# Patient Record
Sex: FEMALE | Race: WHITE | NOT HISPANIC OR LATINO | Employment: FULL TIME | URBAN - METROPOLITAN AREA
[De-identification: names, ages, dates, MRNs, and addresses within clinical notes are randomized per-mention and may not be internally consistent; named-entity substitution may affect disease eponyms.]

---

## 2020-06-01 ENCOUNTER — OFFICE VISIT (OUTPATIENT)
Dept: ENDOCRINOLOGY | Facility: CLINIC | Age: 30
End: 2020-06-01

## 2020-06-01 ENCOUNTER — APPOINTMENT (OUTPATIENT)
Dept: LAB | Facility: HOSPITAL | Age: 30
End: 2020-06-01

## 2020-06-01 VITALS
WEIGHT: 134.4 LBS | HEIGHT: 68 IN | DIASTOLIC BLOOD PRESSURE: 72 MMHG | OXYGEN SATURATION: 99 % | SYSTOLIC BLOOD PRESSURE: 128 MMHG | HEART RATE: 72 BPM | BODY MASS INDEX: 20.37 KG/M2

## 2020-06-01 DIAGNOSIS — E03.8 HYPOTHYROIDISM DUE TO HASHIMOTO'S THYROIDITIS: Primary | ICD-10-CM

## 2020-06-01 DIAGNOSIS — E06.3 HYPOTHYROIDISM DUE TO HASHIMOTO'S THYROIDITIS: Primary | ICD-10-CM

## 2020-06-01 PROCEDURE — 84480 ASSAY TRIIODOTHYRONINE (T3): CPT | Performed by: PHYSICIAN ASSISTANT

## 2020-06-01 PROCEDURE — 99203 OFFICE O/P NEW LOW 30 MIN: CPT | Performed by: PHYSICIAN ASSISTANT

## 2020-06-01 PROCEDURE — 84443 ASSAY THYROID STIM HORMONE: CPT | Performed by: PHYSICIAN ASSISTANT

## 2020-06-01 PROCEDURE — 84439 ASSAY OF FREE THYROXINE: CPT | Performed by: PHYSICIAN ASSISTANT

## 2020-06-01 RX ORDER — LEVOTHYROXINE SODIUM 0.05 MG/1
1.5 TABLET ORAL DAILY
COMMUNITY
Start: 2020-05-02 | End: 2020-06-02 | Stop reason: SDUPTHER

## 2020-06-01 NOTE — PROGRESS NOTES
"  Chief Complaint:   Damari Muller is a 30 y.o. female who is being seen today for  Hypothyroidism . Referred by Amandeep Johnson,      HPI     30 year fairly healthy female comes in today to establish care with new endocrinology provider since moving to [place].   She reports to be feeling fairly well. Has chronic fatigue, but that has improved recently. Has never been a good sleeper, sleeps about 4-6 hours per night. Has had sleep studies in the past. Was told she has \"borderline sleep apnea.\"  She denies recent weight changes, dry skin, arthralgias, edema.  Doesn't eat gluten, dairy, night shades.   She has celiac disease. Also has dye allergies.     Diagnosed: 2010, was diagnosed with Hashimoto's thyroiditis  Medication: levothyroxine 50mcg daily 1 1/2 tabs daily (gluten free from a particular , takes the 50mcg pill due to no dyes, she takes at night at bedtime, about 3-4 hours after dinner. She takes magnesium for migraines, vit d, zinc, selenium. She takes selenium with levothyroxine, otherwise all supplements in the morning. Has had this same routine for a few years and had been stable on current dose.  Previous medication: Synthroid  Hx of radiation to head/neck: no  Family hx of thyroid cancer: no  Grandmother had a thyroid condition but unsure which one.     The following portions of the patient's history were reviewed and updated as appropriate: allergies, current medications, past family history, past medical history, past social history, past surgical history and problem list.    Review of Systems  Review of Systems   Constitutional: Positive for fatigue.   All other systems reviewed and are negative.       Current medications:  Current Outpatient Medications   Medication Sig Dispense Refill   • levothyroxine (SYNTHROID, LEVOTHROID) 50 MCG tablet Take 1.5 tablets by mouth Daily. Pt taking 75 mcg daily.       No current facility-administered medications for this visit.        Physical Exam "   Vitals:    06/01/20 1436   BP: 128/72   Pulse: 72   SpO2: 99%   Body mass index is 20.44 kg/m².  Physical Exam   Constitutional: She is oriented to person, place, and time. She appears well-developed. No distress.   HENT:   Head: Normocephalic.   Right Ear: External ear normal.   Left Ear: External ear normal.   Mouth/Throat: No oropharyngeal exudate.   Eyes: Conjunctivae and lids are normal. Right eye exhibits no discharge. Left eye exhibits no discharge. Right pupil is reactive. Left pupil is reactive.   Neck: No JVD present. No tracheal deviation present. No thyroid mass and no thyromegaly present.   Cardiovascular: Normal rate, regular rhythm, normal heart sounds and intact distal pulses.   No murmur heard.  Pulmonary/Chest: Effort normal and breath sounds normal. No respiratory distress. She has no wheezes.   Abdominal: Soft. Bowel sounds are normal. There is no tenderness.   Musculoskeletal: She exhibits no edema or tenderness.   Lymphadenopathy:     She has no cervical adenopathy.   Neurological: She is alert and oriented to person, place, and time.   Skin: Skin is warm, dry and intact. No rash noted. She is not diaphoretic. No erythema.   Psychiatric: She has a normal mood and affect. Her speech is normal and behavior is normal. Thought content normal.       Labs and Imaging   No results found for: TSH, O6ZIZLI, W7MUGON, THYROIDAB    Assessment / Plan     Damari was seen today for establish care and hypothyroidism.    Diagnoses and all orders for this visit:    Hypothyroidism due to Hashimoto's thyroiditis  -     TSH  -     T4, Free  -     T3        Problem List Items Addressed This Visit     None      Visit Diagnoses     Hypothyroidism due to Hashimoto's thyroiditis    -  Primary    Relevant Medications    levothyroxine (SYNTHROID, LEVOTHROID) 50 MCG tablet    Other Relevant Orders    TSH    T4, Free    T3        Diagnosis was discussed and reviewed with the patient including the advantages of drug  therapy.        1. Patient appears clinically euthyroid. Continue levothyroxine 50mcg 1 1/2 tab daily (no dye in 50mcg pill). TFTs today.       We have discussed in details the nature of the thyroid disease, thyroid hormone action, optimal TSH goals of 0.5-3.5, method of administration of levothyroxine and medication interactions.  I recommended taking the medication on an empty stomach in the morning or at bedtime, at least 30 minutes prior to intake of food or hot drinks and 4 hours apart from calcium or iron supplements.  2. Patient will return to our office in 6 months.  3. The risks and benefits of my recommendations, as well as other treatment options were discussed with the patient today. Questions were answered.      Efrain Siu PA-C

## 2020-06-02 DIAGNOSIS — E06.3 HYPOTHYROIDISM DUE TO HASHIMOTO'S THYROIDITIS: Primary | ICD-10-CM

## 2020-06-02 DIAGNOSIS — E03.8 HYPOTHYROIDISM DUE TO HASHIMOTO'S THYROIDITIS: Primary | ICD-10-CM

## 2020-06-02 LAB
T3 SERPL-MCNC: 79.9 NG/DL (ref 80–200)
T4 FREE SERPL-MCNC: 1.28 NG/DL (ref 0.93–1.7)
TSH SERPL DL<=0.05 MIU/L-ACNC: 2.69 UIU/ML (ref 0.27–4.2)

## 2020-06-02 RX ORDER — LEVOTHYROXINE SODIUM 0.05 MG/1
75 TABLET ORAL DAILY
Qty: 135 TABLET | Refills: 1 | Status: SHIPPED | OUTPATIENT
Start: 2020-06-02 | End: 2020-12-03 | Stop reason: ALTCHOICE

## 2020-07-09 ENCOUNTER — TRANSCRIBE ORDERS (OUTPATIENT)
Dept: ADMINISTRATIVE | Facility: HOSPITAL | Age: 30
End: 2020-07-09

## 2020-07-09 DIAGNOSIS — Z80.3 FAMILY HISTORY OF BREAST CANCER IN FIRST DEGREE RELATIVE: Primary | ICD-10-CM

## 2020-10-20 ENCOUNTER — HOSPITAL ENCOUNTER (OUTPATIENT)
Dept: MAMMOGRAPHY | Facility: HOSPITAL | Age: 30
Discharge: HOME | End: 2020-10-20
Admitting: OBSTETRICS & GYNECOLOGY

## 2020-10-20 DIAGNOSIS — Z80.3 FAMILY HISTORY OF BREAST CANCER IN FIRST DEGREE RELATIVE: ICD-10-CM

## 2020-10-20 PROCEDURE — 77067 SCR MAMMO BI INCL CAD: CPT | Performed by: RADIOLOGY

## 2020-10-20 PROCEDURE — 77063 BREAST TOMOSYNTHESIS BI: CPT | Performed by: RADIOLOGY

## 2020-10-20 PROCEDURE — 77067 SCR MAMMO BI INCL CAD: CPT

## 2020-10-20 PROCEDURE — 77063 BREAST TOMOSYNTHESIS BI: CPT

## 2020-12-01 ENCOUNTER — OFFICE VISIT (OUTPATIENT)
Dept: ENDOCRINOLOGY | Facility: CLINIC | Age: 30
End: 2020-12-01

## 2020-12-01 ENCOUNTER — LAB (OUTPATIENT)
Dept: LAB | Facility: HOSPITAL | Age: 30
End: 2020-12-01

## 2020-12-01 VITALS
WEIGHT: 140.6 LBS | DIASTOLIC BLOOD PRESSURE: 62 MMHG | OXYGEN SATURATION: 99 % | HEART RATE: 61 BPM | BODY MASS INDEX: 21.38 KG/M2 | SYSTOLIC BLOOD PRESSURE: 100 MMHG

## 2020-12-01 DIAGNOSIS — L65.9 HAIR LOSS: ICD-10-CM

## 2020-12-01 DIAGNOSIS — E06.3 HYPOTHYROIDISM DUE TO HASHIMOTO'S THYROIDITIS: Primary | ICD-10-CM

## 2020-12-01 DIAGNOSIS — E03.8 HYPOTHYROIDISM DUE TO HASHIMOTO'S THYROIDITIS: Primary | ICD-10-CM

## 2020-12-01 DIAGNOSIS — R53.82 CHRONIC FATIGUE: ICD-10-CM

## 2020-12-01 LAB
BASOPHILS # BLD AUTO: 0.05 10*3/MM3 (ref 0–0.2)
BASOPHILS NFR BLD AUTO: 1 % (ref 0–1.5)
DEPRECATED RDW RBC AUTO: 41.8 FL (ref 37–54)
EOSINOPHIL # BLD AUTO: 0.1 10*3/MM3 (ref 0–0.4)
EOSINOPHIL NFR BLD AUTO: 2 % (ref 0.3–6.2)
ERYTHROCYTE [DISTWIDTH] IN BLOOD BY AUTOMATED COUNT: 12 % (ref 12.3–15.4)
HCT VFR BLD AUTO: 40.8 % (ref 34–46.6)
HGB BLD-MCNC: 13.3 G/DL (ref 12–15.9)
IMM GRANULOCYTES # BLD AUTO: 0.01 10*3/MM3 (ref 0–0.05)
IMM GRANULOCYTES NFR BLD AUTO: 0.2 % (ref 0–0.5)
IRON 24H UR-MRATE: 118 MCG/DL (ref 37–145)
LYMPHOCYTES # BLD AUTO: 1.95 10*3/MM3 (ref 0.7–3.1)
LYMPHOCYTES NFR BLD AUTO: 39.8 % (ref 19.6–45.3)
MCH RBC QN AUTO: 31.1 PG (ref 26.6–33)
MCHC RBC AUTO-ENTMCNC: 32.6 G/DL (ref 31.5–35.7)
MCV RBC AUTO: 95.3 FL (ref 79–97)
MONOCYTES # BLD AUTO: 0.43 10*3/MM3 (ref 0.1–0.9)
MONOCYTES NFR BLD AUTO: 8.8 % (ref 5–12)
NEUTROPHILS NFR BLD AUTO: 2.36 10*3/MM3 (ref 1.7–7)
NEUTROPHILS NFR BLD AUTO: 48.2 % (ref 42.7–76)
NRBC BLD AUTO-RTO: 0 /100 WBC (ref 0–0.2)
PLATELET # BLD AUTO: 182 10*3/MM3 (ref 140–450)
PMV BLD AUTO: 12.1 FL (ref 6–12)
RBC # BLD AUTO: 4.28 10*6/MM3 (ref 3.77–5.28)
WBC # BLD AUTO: 4.9 10*3/MM3 (ref 3.4–10.8)

## 2020-12-01 PROCEDURE — 83540 ASSAY OF IRON: CPT | Performed by: PHYSICIAN ASSISTANT

## 2020-12-01 PROCEDURE — 85025 COMPLETE CBC W/AUTO DIFF WBC: CPT | Performed by: PHYSICIAN ASSISTANT

## 2020-12-01 PROCEDURE — 82607 VITAMIN B-12: CPT | Performed by: PHYSICIAN ASSISTANT

## 2020-12-01 PROCEDURE — 99214 OFFICE O/P EST MOD 30 MIN: CPT | Performed by: PHYSICIAN ASSISTANT

## 2020-12-01 PROCEDURE — 84443 ASSAY THYROID STIM HORMONE: CPT | Performed by: PHYSICIAN ASSISTANT

## 2020-12-01 PROCEDURE — 82306 VITAMIN D 25 HYDROXY: CPT | Performed by: PHYSICIAN ASSISTANT

## 2020-12-01 PROCEDURE — 84439 ASSAY OF FREE THYROXINE: CPT | Performed by: PHYSICIAN ASSISTANT

## 2020-12-01 PROCEDURE — 84480 ASSAY TRIIODOTHYRONINE (T3): CPT | Performed by: PHYSICIAN ASSISTANT

## 2020-12-01 PROCEDURE — 82728 ASSAY OF FERRITIN: CPT | Performed by: PHYSICIAN ASSISTANT

## 2020-12-01 RX ORDER — LEVOTHYROXINE SODIUM 0.05 MG/1
75 TABLET ORAL DAILY
Qty: 135 TABLET | Refills: 1 | Status: CANCELLED | OUTPATIENT
Start: 2020-12-01

## 2020-12-01 NOTE — PROGRESS NOTES
Chief Complaint:   Damari Muller is a 30 y.o. female who is being seen today for  Hypothyroidism .     HPI     30 year fairly healthy female comes in today for f/u of hypothyroidism.   She has celiac disease as well as dye allergies. She takes levothyroxine 50 mg 1.5 pills daily due to no dyes.   Tirosint was not approved in the past though not sure if it was PA'd.   She has hx of iron deficiency and would like iron checked. She has been more fatigued recently with some dizziness. Hair loss is chronic. Does not feel like her symptoms are related to the thyroid- in the past when she has issues with thyroid levels being off she experienced facial swelling, which she is not having right now. She sleeps poorly, but this is chronic and unchanged for her.   She takes vit d 5000 IU daily.     Diagnosed: 2010, was diagnosed with Hashimoto's thyroiditis  Medication: levothyroxine 50mcg daily 1 1/2 tabs daily (gluten free from a particular , takes the 50mcg pill due to no dyes, she takes at night at bedtime, about 3-4 hours after dinner. She takes magnesium for migraines, vit d, zinc, selenium. She takes selenium with levothyroxine, otherwise all supplements in the morning. Has had this same routine for a few years and had been stable on current dose.  Previous medication: Synthroid  Hx of radiation to head/neck: no  Family hx of thyroid cancer: no  Grandmother had a thyroid condition but unsure which one.     The following portions of the patient's history were reviewed and updated as appropriate: allergies, current medications, past family history, past medical history, past social history, past surgical history and problem list.    Review of Systems  Review of Systems   Constitutional: Positive for fatigue.   Skin:        Hair loss   Neurological: Positive for dizziness.   All other systems reviewed and are negative.       Current medications:  Current Outpatient Medications   Medication Sig Dispense  Refill   • levothyroxine (SYNTHROID, LEVOTHROID) 50 MCG tablet Take 1.5 tablets by mouth Daily. Pt taking 75 mcg daily. 135 tablet 1     No current facility-administered medications for this visit.        Physical Exam   Vitals:    12/01/20 1305   BP: 100/62   Pulse: 61   SpO2: 99%   Body mass index is 21.38 kg/m².  Physical Exam   Constitutional: She is oriented to person, place, and time. She appears well-developed. No distress.   HENT:   Head: Normocephalic.   Right Ear: External ear normal.   Left Ear: External ear normal.   Mouth/Throat: No oropharyngeal exudate.   Eyes: Conjunctivae and lids are normal. Right eye exhibits no discharge. Left eye exhibits no discharge. Right pupil is reactive. Left pupil is reactive.   Neck: No JVD present. No tracheal deviation present. No thyroid mass and no thyromegaly present.   Cardiovascular: Normal rate, regular rhythm and normal heart sounds.   No murmur heard.  Pulmonary/Chest: Effort normal and breath sounds normal. No respiratory distress. She has no wheezes.   Abdominal: Soft. Bowel sounds are normal. There is no abdominal tenderness.   Musculoskeletal: No tenderness.   Lymphadenopathy:     She has no cervical adenopathy.   Neurological: She is alert and oriented to person, place, and time.   Skin: Skin is warm and dry. No rash noted. She is not diaphoretic. No erythema.   Psychiatric: Her speech is normal and behavior is normal. Thought content normal.       Labs and Imaging   Lab Results   Component Value Date    TSH 2.690 06/01/2020    O9SMSVN 79.9 (L) 06/01/2020       Assessment / Plan     Diagnoses and all orders for this visit:    1. Hypothyroidism due to Hashimoto's thyroiditis (Primary)  -     TSH  -     T4, Free    2. Hair loss  -     CBC & Differential  -     Iron  -     Ferritin  -     Vitamin D 25 Hydroxy  -     CBC Auto Differential    3. Chronic fatigue  -     CBC & Differential  -     Iron  -     Ferritin  -     Vitamin D 25 Hydroxy  -     Vitamin  B12  -     CBC Auto Differential    Other orders  -     Cancel: levothyroxine (SYNTHROID, LEVOTHROID) 50 MCG tablet; Take 1.5 tablets by mouth Daily. Pt taking 75 mcg daily.  Dispense: 135 tablet; Refill: 1        Problem List Items Addressed This Visit     None      Visit Diagnoses     Hypothyroidism due to Hashimoto's thyroiditis    -  Primary    Relevant Orders    TSH    T4, Free    Hair loss        Relevant Orders    CBC & Differential    Iron    Ferritin    Vitamin D 25 Hydroxy    CBC Auto Differential    Chronic fatigue        Relevant Orders    CBC & Differential    Iron    Ferritin    Vitamin D 25 Hydroxy    Vitamin B12    CBC Auto Differential        Diagnosis was discussed and reviewed with the patient including the advantages of drug therapy.        1. Patient appears clinically euthyroid. Continue levothyroxine 50mcg 1 1/2 tab daily (no dye in 50mcg pill). TFTs today. Will try to change to Tirosint 75 mcg daily- will require a PA. Check vit d, iron, ferritin, cbc, b12 with increased fatigue recently.   2. Patient will return to our office in 12 months.  3. The risks and benefits of my recommendations, as well as other treatment options were discussed with the patient today. Questions were answered.      Efrain Siu PA-C

## 2020-12-02 DIAGNOSIS — E06.3 HYPOTHYROIDISM DUE TO HASHIMOTO'S THYROIDITIS: Primary | ICD-10-CM

## 2020-12-02 DIAGNOSIS — E03.8 HYPOTHYROIDISM DUE TO HASHIMOTO'S THYROIDITIS: Primary | ICD-10-CM

## 2020-12-02 LAB
25(OH)D3 SERPL-MCNC: 57.9 NG/ML (ref 30–100)
FERRITIN SERPL-MCNC: 33.6 NG/ML (ref 13–150)
T3 SERPL-MCNC: 70.4 NG/DL (ref 80–200)
T4 FREE SERPL-MCNC: 1.2 NG/DL (ref 0.93–1.7)
TSH SERPL DL<=0.05 MIU/L-ACNC: 3.81 UIU/ML (ref 0.27–4.2)
VIT B12 BLD-MCNC: 1082 PG/ML (ref 211–946)

## 2020-12-03 DIAGNOSIS — E06.3 HYPOTHYROIDISM DUE TO HASHIMOTO'S THYROIDITIS: Primary | ICD-10-CM

## 2020-12-03 DIAGNOSIS — E03.8 HYPOTHYROIDISM DUE TO HASHIMOTO'S THYROIDITIS: Primary | ICD-10-CM

## 2020-12-03 RX ORDER — LEVOTHYROXINE SODIUM 75 UG/1
75 CAPSULE ORAL DAILY
Qty: 90 CAPSULE | Refills: 1 | Status: SHIPPED | OUTPATIENT
Start: 2020-12-03 | End: 2021-06-08

## 2020-12-03 RX ORDER — LIOTHYRONINE SODIUM 5 UG/1
2.5 TABLET ORAL DAILY
Qty: 45 TABLET | Refills: 1 | Status: SHIPPED | OUTPATIENT
Start: 2020-12-03 | End: 2021-04-14

## 2020-12-04 ENCOUNTER — TELEPHONE (OUTPATIENT)
Dept: ENDOCRINOLOGY | Facility: CLINIC | Age: 30
End: 2020-12-04

## 2020-12-04 DIAGNOSIS — R74.8 ELEVATED VITAMIN B12 LEVEL: Primary | ICD-10-CM

## 2020-12-04 NOTE — TELEPHONE ENCOUNTER
Returned patient call, she just wanted to make sure her SX were not related to B12 level. All questions answered and PT expressed understanding

## 2021-04-14 ENCOUNTER — OFFICE VISIT (OUTPATIENT)
Dept: ENDOCRINOLOGY | Facility: CLINIC | Age: 31
End: 2021-04-14

## 2021-04-14 ENCOUNTER — LAB (OUTPATIENT)
Dept: LAB | Facility: HOSPITAL | Age: 31
End: 2021-04-14

## 2021-04-14 VITALS
HEIGHT: 68 IN | DIASTOLIC BLOOD PRESSURE: 60 MMHG | BODY MASS INDEX: 20.03 KG/M2 | TEMPERATURE: 97.5 F | HEART RATE: 65 BPM | SYSTOLIC BLOOD PRESSURE: 100 MMHG | OXYGEN SATURATION: 100 % | WEIGHT: 132.2 LBS

## 2021-04-14 DIAGNOSIS — E03.8 HYPOTHYROIDISM DUE TO HASHIMOTO'S THYROIDITIS: Primary | ICD-10-CM

## 2021-04-14 DIAGNOSIS — E03.8 HYPOTHYROIDISM DUE TO HASHIMOTO'S THYROIDITIS: Primary | Chronic | ICD-10-CM

## 2021-04-14 DIAGNOSIS — R53.82 CHRONIC FATIGUE: ICD-10-CM

## 2021-04-14 DIAGNOSIS — E06.3 HYPOTHYROIDISM DUE TO HASHIMOTO'S THYROIDITIS: Primary | ICD-10-CM

## 2021-04-14 DIAGNOSIS — E06.3 HYPOTHYROIDISM DUE TO HASHIMOTO'S THYROIDITIS: Primary | Chronic | ICD-10-CM

## 2021-04-14 LAB
CORTIS SERPL-MCNC: 15.19 MCG/DL
CORTIS SERPL-MCNC: 25.97 MCG/DL
CORTIS SERPL-MCNC: 29.75 MCG/DL
T3 SERPL-MCNC: 135 NG/DL (ref 80–200)
T4 FREE SERPL-MCNC: 1.42 NG/DL (ref 0.93–1.7)
TSH SERPL DL<=0.05 MIU/L-ACNC: 1.98 UIU/ML (ref 0.27–4.2)
VIT B12 BLD-MCNC: 952 PG/ML (ref 211–946)

## 2021-04-14 PROCEDURE — 84439 ASSAY OF FREE THYROXINE: CPT | Performed by: PHYSICIAN ASSISTANT

## 2021-04-14 PROCEDURE — 82533 TOTAL CORTISOL: CPT | Performed by: PHYSICIAN ASSISTANT

## 2021-04-14 PROCEDURE — 84480 ASSAY TRIIODOTHYRONINE (T3): CPT | Performed by: PHYSICIAN ASSISTANT

## 2021-04-14 PROCEDURE — 99214 OFFICE O/P EST MOD 30 MIN: CPT | Performed by: PHYSICIAN ASSISTANT

## 2021-04-14 PROCEDURE — 82607 VITAMIN B-12: CPT | Performed by: PHYSICIAN ASSISTANT

## 2021-04-14 PROCEDURE — 84443 ASSAY THYROID STIM HORMONE: CPT | Performed by: PHYSICIAN ASSISTANT

## 2021-04-14 PROCEDURE — 96372 THER/PROPH/DIAG INJ SC/IM: CPT | Performed by: PHYSICIAN ASSISTANT

## 2021-04-14 RX ORDER — ETONOGESTREL AND ETHINYL ESTRADIOL 11.7; 2.7 MG/1; MG/1
INSERT, EXTENDED RELEASE VAGINAL
COMMUNITY
Start: 2021-03-24 | End: 2021-11-16

## 2021-04-14 RX ORDER — COSYNTROPIN 0.25 MG/ML
0.25 INJECTION, POWDER, FOR SOLUTION INTRAMUSCULAR; INTRAVENOUS ONCE
Status: COMPLETED | OUTPATIENT
Start: 2021-04-14 | End: 2021-04-14

## 2021-04-14 RX ADMIN — COSYNTROPIN 0.25 MG: 0.25 INJECTION, POWDER, FOR SOLUTION INTRAMUSCULAR; INTRAVENOUS at 10:51

## 2021-04-14 NOTE — PROGRESS NOTES
Chief Complaint:   Damari Muller is a 31 y.o. female who is being seen today for  Hypothyroidism .     HPI     30 year fairly healthy female comes in today for f/u of hypothyroidism. Was diagnosed with hashimoto's in 2010 and started on supplement at that time.   Previous medication: Synthroid, levothyroxine, armour.  She has celiac disease as well as dye allergies - therefore requires Tirosint brand, on 75 mcg daily. Also on cytomel 5 mcg 1/2 pill daily. Cytomel added 12/2020 but it has not helped with fatigue.   She c/o chronic fatigue but much worse recently. Sleep is poor but this is unchanged for her. She feels fatigued even after a good nights rest though. Has occasional dizziness, no syncope. Weight stable, appetite stable. Does not crave salt. No symptoms of hypoglycemia.   Hair loss is chronic and stable.   She takes vit d 5000 IU daily.       The following portions of the patient's history were reviewed and updated by me as appropriate: allergies, current medications, past family history, past social history, past surgical history and problem list.    Review of Systems  Review of Systems   Constitutional: Positive for fatigue.   Skin:        Hair loss   Neurological: Positive for dizziness.   All other systems reviewed and are negative.       Current medications:  Current Outpatient Medications   Medication Sig Dispense Refill   • etonogestrel-ethinyl estradiol (NUVARING) 0.12-0.015 MG/24HR vaginal ring      • Tirosint 75 MCG capsule Take 1 capsule by mouth Daily. 90 capsule 1     Current Facility-Administered Medications   Medication Dose Route Frequency Provider Last Rate Last Admin   • cosyntropin (CORTROSYN) injection 0.25 mg  0.25 mg Intramuscular Once Efrain Siu PA-C           Physical Exam   Vitals:    04/14/21 1011   BP: 100/60   Pulse: 65   Temp: 97.5 °F (36.4 °C)   SpO2: 100%   Body mass index is 20.1 kg/m².  Physical Exam   Constitutional: She is oriented to person, place, and time.  She appears well-developed. No distress.   HENT:   Head: Normocephalic.   Right Ear: External ear normal.   Left Ear: External ear normal.   Mouth/Throat: No oropharyngeal exudate.   Eyes: Conjunctivae and lids are normal. Right eye exhibits no discharge. Left eye exhibits no discharge. Right pupil is reactive. Left pupil is reactive.   Neck: No JVD present. No tracheal deviation present. No thyroid mass and no thyromegaly present.   Cardiovascular: Normal rate, regular rhythm and normal heart sounds.   No murmur heard.  Pulmonary/Chest: Effort normal and breath sounds normal. No respiratory distress. She has no wheezes.   Abdominal: Soft. Bowel sounds are normal. There is no abdominal tenderness.   Musculoskeletal: No tenderness.   Lymphadenopathy:     She has no cervical adenopathy.   Neurological: She is alert and oriented to person, place, and time.   Skin: Skin is warm and dry. No rash noted. She is not diaphoretic. No erythema.   Psychiatric: Her speech is normal and behavior is normal. Thought content normal.       Labs and Imaging   Lab Results   Component Value Date    TSH 3.810 12/01/2020    O9DPGGR 70.4 (L) 12/01/2020       Assessment / Plan     Diagnoses and all orders for this visit:    1. Hypothyroidism due to Hashimoto's thyroiditis (Primary)  -     TSH  -     T4, Free  -     T3    2. Chronic fatigue  -     Cortisol  -     Cortisol  -     Cortisol  -     cosyntropin (CORTROSYN) injection 0.25 mg  -     Vitamin B12        Problem List Items Addressed This Visit        Other    Hypothyroidism due to Hashimoto's thyroiditis - Primary (Chronic)    Relevant Orders    TSH    T4, Free    T3      Other Visit Diagnoses     Chronic fatigue        Relevant Medications    cosyntropin (CORTROSYN) injection 0.25 mg    Other Relevant Orders    Cortisol    Cortisol    Cortisol    Vitamin B12        Diagnosis was discussed and reviewed with the patient including the advantages of drug therapy.        1. She c/o  worsening fatigue- repeat TFTs. Screen for adrenal insufficiency though low suspicion. Continue Tirosint 75 mcg daily. DC cytomel - does not feel better on it.   2. Patient will return to our office in 4 months.  3. The risks and benefits of my recommendations, as well as other treatment options were discussed with the patient today. Questions were answered.      Efrain Siu PA-C

## 2021-06-08 DIAGNOSIS — E06.3 HYPOTHYROIDISM DUE TO HASHIMOTO'S THYROIDITIS: ICD-10-CM

## 2021-06-08 DIAGNOSIS — E03.8 HYPOTHYROIDISM DUE TO HASHIMOTO'S THYROIDITIS: ICD-10-CM

## 2021-06-08 RX ORDER — LIOTHYRONINE SODIUM 5 UG/1
TABLET ORAL
Qty: 45 TABLET | Refills: 0 | OUTPATIENT
Start: 2021-06-08

## 2021-06-08 RX ORDER — LEVOTHYROXINE SODIUM 75 UG/1
CAPSULE ORAL
Qty: 90 CAPSULE | Refills: 1 | Status: SHIPPED | OUTPATIENT
Start: 2021-06-08 | End: 2021-12-06 | Stop reason: SDUPTHER

## 2021-06-16 ENCOUNTER — TELEPHONE (OUTPATIENT)
Dept: ENDOCRINOLOGY | Facility: CLINIC | Age: 31
End: 2021-06-16

## 2021-07-08 ENCOUNTER — TELEPHONE (OUTPATIENT)
Dept: ENDOCRINOLOGY | Facility: CLINIC | Age: 31
End: 2021-07-08

## 2021-07-08 NOTE — TELEPHONE ENCOUNTER
[employer] IS REQUIRING A PRIOR AUTH. KARON WITH [employer] STATES THAT SHE SPOKE WITH PATIENT WHO TOLD HER THAT WHEN SHE WENT TO  MEDICATION YESTERDAY, SHE WAS INFORMED THAT IT WOULD HAVE A HIGH COPAY AND THAT SHE NEEDED TO CONTACT HER INSURANCE PROVIDER. [employer] STATES THAT A PRIOR AUTH CAN BE DONE TO SEE IF COVERAGE CAN BE PROVIDED FOR THIS MEDICATION.

## 2021-08-16 ENCOUNTER — LAB (OUTPATIENT)
Dept: LAB | Facility: HOSPITAL | Age: 31
End: 2021-08-16

## 2021-08-16 ENCOUNTER — OFFICE VISIT (OUTPATIENT)
Dept: ENDOCRINOLOGY | Facility: CLINIC | Age: 31
End: 2021-08-16

## 2021-08-16 VITALS
DIASTOLIC BLOOD PRESSURE: 60 MMHG | HEART RATE: 72 BPM | WEIGHT: 127.2 LBS | OXYGEN SATURATION: 100 % | HEIGHT: 68 IN | BODY MASS INDEX: 19.28 KG/M2 | SYSTOLIC BLOOD PRESSURE: 100 MMHG

## 2021-08-16 DIAGNOSIS — E03.8 HYPOTHYROIDISM DUE TO HASHIMOTO'S THYROIDITIS: Primary | Chronic | ICD-10-CM

## 2021-08-16 DIAGNOSIS — E03.8 HYPOTHYROIDISM DUE TO HASHIMOTO'S THYROIDITIS: ICD-10-CM

## 2021-08-16 DIAGNOSIS — R74.8 ELEVATED VITAMIN B12 LEVEL: ICD-10-CM

## 2021-08-16 DIAGNOSIS — E06.3 HYPOTHYROIDISM DUE TO HASHIMOTO'S THYROIDITIS: ICD-10-CM

## 2021-08-16 DIAGNOSIS — E06.3 HYPOTHYROIDISM DUE TO HASHIMOTO'S THYROIDITIS: Primary | Chronic | ICD-10-CM

## 2021-08-16 PROCEDURE — 82607 VITAMIN B-12: CPT

## 2021-08-16 PROCEDURE — 84439 ASSAY OF FREE THYROXINE: CPT

## 2021-08-16 PROCEDURE — 84443 ASSAY THYROID STIM HORMONE: CPT

## 2021-08-16 PROCEDURE — 84480 ASSAY TRIIODOTHYRONINE (T3): CPT

## 2021-08-16 PROCEDURE — 99213 OFFICE O/P EST LOW 20 MIN: CPT | Performed by: PHYSICIAN ASSISTANT

## 2021-08-16 NOTE — PROGRESS NOTES
Chief Complaint:   Damari Muller is a 31 y.o. female who is being seen today for  Hypothyroidism .     HPI     31 year healthy female comes in today for f/u of hypothyroidism. Was diagnosed with hashimoto's in 2010 and started on supplement at that time.   Previous medication: Synthroid, levothyroxine, armour.  She has celiac disease as well as dye allergies - therefore requires Tirosint brand, on 75 mcg daily. Was previously on cytomel 5 mcg daily due to her chronic fatigue, but it was discontinued as it did not improve any of her symptoms.   She continues to have chronic fatigue. No new hypo- or hyperthyroid symptoms since last visit  Is being seen at an [facility] and was found to have low progesterone and testosterone.   Was started on progesterone 200 mg daily and initially felt better but now feels this has caused more irritability and mood swings. Dose was decreased in half few days ago. She will f/u with them in a month. They suggested testosterone supplementation though she did not want this.   Starting new job soon, stationed in [place], but will be working remotely from [place].     ACTH stim test was normal 4/2021.       The following portions of the patient's history were reviewed and updated by me as appropriate: allergies, current medications, past family history, past social history, past surgical history and problem list.    Review of Systems  Review of Systems   Constitutional: Positive for fatigue.   Skin:        Hair loss - stable   All other systems reviewed and are negative.       Current medications:  Current Outpatient Medications   Medication Sig Dispense Refill   • etonogestrel-ethinyl estradiol (NUVARING) 0.12-0.015 MG/24HR vaginal ring      • Tirosint 75 MCG capsule Take 1 capsule by mouth once daily 90 capsule 1     No current facility-administered medications for this visit.       Physical Exam   Vitals:    08/16/21 0942   BP: 100/60   Pulse: 72   SpO2: 100%   Body mass index  is 19.34 kg/m².  Physical Exam   Constitutional: She is oriented to person, place, and time. She appears well-developed. No distress.   HENT:   Head: Normocephalic.   Right Ear: External ear normal.   Left Ear: External ear normal.   Mouth/Throat: No oropharyngeal exudate.   Eyes: Conjunctivae and lids are normal. Right eye exhibits no discharge. Left eye exhibits no discharge. Right pupil is reactive. Left pupil is reactive.   Neck: No JVD present. No tracheal deviation present. No thyroid mass and no thyromegaly present.   Cardiovascular: Normal rate, regular rhythm and normal heart sounds.   No murmur heard.  Pulmonary/Chest: Effort normal and breath sounds normal. No respiratory distress. She has no wheezes.   Abdominal: Soft. Bowel sounds are normal. There is no abdominal tenderness.   Musculoskeletal: No tenderness.   Lymphadenopathy:     She has no cervical adenopathy.   Neurological: She is alert and oriented to person, place, and time.   Skin: Skin is warm and dry. No rash noted. She is not diaphoretic. No erythema.   Psychiatric: Her speech is normal and behavior is normal. Thought content normal.       Labs and Imaging   Lab Results   Component Value Date    TSH 1.980 04/14/2021    M2SLDJK 135.0 04/14/2021       Assessment / Plan     Diagnoses and all orders for this visit:    1. Hypothyroidism due to Hashimoto's thyroiditis (Primary)  -     Cancel: TSH  -     Cancel: T4, Free        Problem List Items Addressed This Visit        Other    Hypothyroidism due to Hashimoto's thyroiditis - Primary (Chronic)        Diagnosis was discussed and reviewed with the patient including the advantages of drug therapy.        1. Pt appears clinically euthyroid. Fatigue is likely multifactorial. Repeat TFTs. Continue Tirosint 75 mcg daily unless labs dictate otherwise.   2. Patient will return to our office in 6 months.  3. The risks and benefits of my recommendations, as well as other treatment options were discussed  with the patient today. Questions were answered.      Efrain Siu PA-C

## 2021-08-17 LAB
T3 SERPL-MCNC: 136 NG/DL (ref 80–200)
T4 FREE SERPL-MCNC: 1.85 NG/DL (ref 0.93–1.7)
TSH SERPL DL<=0.05 MIU/L-ACNC: 1.96 UIU/ML (ref 0.27–4.2)
VIT B12 BLD-MCNC: 1011 PG/ML (ref 211–946)

## 2021-09-10 PROCEDURE — 87077 CULTURE AEROBIC IDENTIFY: CPT | Performed by: NURSE PRACTITIONER

## 2021-09-10 PROCEDURE — 87086 URINE CULTURE/COLONY COUNT: CPT | Performed by: NURSE PRACTITIONER

## 2021-09-10 PROCEDURE — 87186 SC STD MICRODIL/AGAR DIL: CPT | Performed by: NURSE PRACTITIONER

## 2021-09-13 ENCOUNTER — TELEPHONE (OUTPATIENT)
Dept: URGENT CARE | Facility: CLINIC | Age: 31
End: 2021-09-13

## 2021-09-13 NOTE — TELEPHONE ENCOUNTER
Inform patient that ordered antibiotic was appropriate for her infection.  Patient states her symptoms have improved since beginning antibiotic.  Instructed her to finish antibiotic and follow-up as needed.

## 2021-09-22 ENCOUNTER — LAB (OUTPATIENT)
Dept: LAB | Facility: HOSPITAL | Age: 31
End: 2021-09-22

## 2021-09-22 ENCOUNTER — TRANSCRIBE ORDERS (OUTPATIENT)
Dept: LAB | Facility: HOSPITAL | Age: 31
End: 2021-09-22

## 2021-09-22 DIAGNOSIS — R35.0 URINARY FREQUENCY: Primary | ICD-10-CM

## 2021-09-22 DIAGNOSIS — R35.0 URINARY FREQUENCY: ICD-10-CM

## 2021-09-22 LAB
BACTERIA UR QL AUTO: ABNORMAL /HPF
BILIRUB UR QL STRIP: NEGATIVE
BILIRUB UR QL STRIP: NEGATIVE
CLARITY UR: CLEAR
CLARITY UR: CLEAR
COLOR UR: YELLOW
COLOR UR: YELLOW
GLUCOSE UR STRIP-MCNC: NEGATIVE MG/DL
GLUCOSE UR STRIP-MCNC: NEGATIVE MG/DL
HGB UR QL STRIP.AUTO: NEGATIVE
HGB UR QL STRIP.AUTO: NEGATIVE
HYALINE CASTS UR QL AUTO: ABNORMAL /LPF
KETONES UR QL STRIP: NEGATIVE
KETONES UR QL STRIP: NEGATIVE
LEUKOCYTE ESTERASE UR QL STRIP.AUTO: ABNORMAL
LEUKOCYTE ESTERASE UR QL STRIP.AUTO: ABNORMAL
NITRITE UR QL STRIP: NEGATIVE
NITRITE UR QL STRIP: NEGATIVE
PH UR STRIP.AUTO: 7.5 [PH] (ref 5–8)
PH UR STRIP.AUTO: 7.5 [PH] (ref 5–8)
PROT UR QL STRIP: NEGATIVE
PROT UR QL STRIP: NEGATIVE
RBC # UR: ABNORMAL /HPF
REF LAB TEST METHOD: ABNORMAL
SP GR UR STRIP: 1.01 (ref 1–1.03)
SP GR UR STRIP: 1.01 (ref 1–1.03)
SQUAMOUS #/AREA URNS HPF: ABNORMAL /HPF
UROBILINOGEN UR QL STRIP: ABNORMAL
UROBILINOGEN UR QL STRIP: ABNORMAL
WBC UR QL AUTO: ABNORMAL /HPF

## 2021-09-22 PROCEDURE — 87077 CULTURE AEROBIC IDENTIFY: CPT

## 2021-09-22 PROCEDURE — 81001 URINALYSIS AUTO W/SCOPE: CPT

## 2021-09-22 PROCEDURE — 87186 SC STD MICRODIL/AGAR DIL: CPT

## 2021-09-22 PROCEDURE — 87086 URINE CULTURE/COLONY COUNT: CPT

## 2021-09-24 LAB — BACTERIA SPEC AEROBE CULT: ABNORMAL

## 2021-11-16 ENCOUNTER — TELEMEDICINE (OUTPATIENT)
Dept: FAMILY MEDICINE CLINIC | Facility: TELEHEALTH | Age: 31
End: 2021-11-16

## 2021-11-16 DIAGNOSIS — R42 DIZZINESS: Primary | ICD-10-CM

## 2021-11-16 DIAGNOSIS — R11.0 NAUSEA: ICD-10-CM

## 2021-11-16 PROCEDURE — 99213 OFFICE O/P EST LOW 20 MIN: CPT | Performed by: NURSE PRACTITIONER

## 2021-11-16 RX ORDER — ONDANSETRON 4 MG/1
4 TABLET, ORALLY DISINTEGRATING ORAL EVERY 8 HOURS PRN
Qty: 20 TABLET | Refills: 0 | Status: SHIPPED | OUTPATIENT
Start: 2021-11-16 | End: 2021-12-01

## 2021-11-16 RX ORDER — MECLIZINE HYDROCHLORIDE 25 MG/1
25 TABLET ORAL 3 TIMES DAILY PRN
Qty: 30 TABLET | Refills: 0 | Status: SHIPPED | OUTPATIENT
Start: 2021-11-16 | End: 2022-06-01

## 2021-11-16 NOTE — PROGRESS NOTES
You have chosen to receive care through a telehealth visit.  Do you consent to use a video/audio connection for your medical care today? Yes     CHIEF COMPLAINT  Chief Complaint   Patient presents with   • Dizziness         HPI  Damari Muller is a 31 y.o. female  presents with complaint of dizziness for past 3 days.  Lasting about 1-2 hours in the mornings.  This morning was unable to walk.  Denies congestion, ear pain, cough, fever.  She has had issues with anemia in the past and is wondering if that could be causing her dizziness    Review of Systems   Neurological: Positive for dizziness.   All other systems reviewed and are negative.      Past Medical History:   Diagnosis Date   • Anemia    • Celiac disease    • Hypothyroidism    • Migraine    • Thyroid disease        Family History   Problem Relation Age of Onset   • Migraines Mother    • Breast cancer Mother 30   • Cancer Maternal Grandmother    • Migraines Maternal Grandmother    • Thyroid disease Maternal Grandmother    • Ovarian cancer Maternal Grandmother 80   • Cancer Maternal Grandfather        Social History     Socioeconomic History   • Marital status: Single   Tobacco Use   • Smoking status: Never Smoker   • Smokeless tobacco: Never Used   Substance and Sexual Activity   • Alcohol use: Yes     Comment: occa   • Drug use: Never   • Sexual activity: Yes         There were no vitals taken for this visit.    PHYSICAL EXAM  Physical Exam   Constitutional: She is oriented to person, place, and time. She appears well-developed and well-nourished. She does not have a sickly appearance. She does not appear ill.   HENT:   Head: Normocephalic and atraumatic.   Pulmonary/Chest: Effort normal.  No respiratory distress.  Neurological: She is alert and oriented to person, place, and time.         Diagnoses and all orders for this visit:    1. Dizziness (Primary)  -     meclizine (ANTIVERT) 25 MG tablet; Take 1 tablet by mouth 3 (Three) Times a Day As Needed for  Dizziness.  Dispense: 30 tablet; Refill: 0    2. Nausea  -     ondansetron ODT (ZOFRAN-ODT) 4 MG disintegrating tablet; Place 1 tablet on the tongue Every 8 (Eight) Hours As Needed for Nausea or Vomiting.  Dispense: 20 tablet; Refill: 0    --take all medications as prescribed  --schedule with PCP for follow-up regarding dizziness for possible labs  --if no improvement in 24-48 hours, recommend going to ER for evaluation    FOLLOW-UP  As discussed during visit with PCP/[facility] if no improvement or Urgent Care/Emergency Department if worsening of symptoms    Patient verbalizes understanding of medication dosage, comfort measures, instructions for treatment and follow-up.    Jess Moon, APRN  11/16/2021  13:09 EST    This visit was performed via Telehealth.  This patient has been instructed to follow-up with their primary care provider if their symptoms worsen or the treatment provided does not resolve their illness.

## 2021-11-16 NOTE — PATIENT INSTRUCTIONS
Dizziness  Dizziness is a common problem. It is a feeling of unsteadiness or light-headedness. You may feel like you are about to faint. Dizziness can lead to injury if you stumble or fall. Anyone can become dizzy, but dizziness is more common in older adults. This condition can be caused by a number of things, including medicines, dehydration, or illness.  Follow these instructions at home:  Eating and drinking  · Drink enough fluid to keep your urine clear or pale yellow. This helps to keep you from becoming dehydrated. Try to drink more clear fluids, such as water.  · Do not drink alcohol.  · Limit your caffeine intake if told to do so by your health care provider. Check ingredients and nutrition facts to see if a food or beverage contains caffeine.  · Limit your salt (sodium) intake if told to do so by your health care provider. Check ingredients and nutrition facts to see if a food or beverage contains sodium.  Activity  · Avoid making quick movements.  ? Rise slowly from chairs and steady yourself until you feel okay.  ? In the morning, first sit up on the side of the bed. When you feel okay, stand slowly while you hold onto something until you know that your balance is fine.  · If you need to  one place for a long time, move your legs often. Tighten and relax the muscles in your legs while you are standing.  · Do not drive or use heavy machinery if you feel dizzy.  · Avoid bending down if you feel dizzy. Place items in your home so that they are easy for you to reach without leaning over.  Lifestyle  · Do not use any products that contain nicotine or tobacco, such as cigarettes and e-cigarettes. If you need help quitting, ask your health care provider.  · Try to reduce your stress level by using methods such as yoga or meditation. Talk with your health care provider if you need help to manage your stress.  General instructions  · Watch your dizziness for any changes.  · Take over-the-counter and  prescription medicines only as told by your health care provider. Talk with your health care provider if you think that your dizziness is caused by a medicine that you are taking.  · Tell a friend or a family member that you are feeling dizzy. If he or she notices any changes in your behavior, have this person call your health care provider.  · Keep all follow-up visits as told by your health care provider. This is important.  Contact a health care provider if:  · Your dizziness does not go away.  · Your dizziness or light-headedness gets worse.  · You feel nauseous.  · You have reduced hearing.  · You have new symptoms.  · You are unsteady on your feet or you feel like the room is spinning.  Get help right away if:  · You vomit or have diarrhea and are unable to eat or drink anything.  · You have problems talking, walking, swallowing, or using your arms, hands, or legs.  · You feel generally weak.  · You are not thinking clearly or you have trouble forming sentences. It may take a friend or family member to notice this.  · You have chest pain, abdominal pain, shortness of breath, or sweating.  · Your vision changes.  · You have any bleeding.  · You have a severe headache.  · You have neck pain or a stiff neck.  · You have a fever.  These symptoms may represent a serious problem that is an emergency. Do not wait to see if the symptoms will go away. Get medical help right away. Call your local emergency services (911 in the [place] Do not drive yourself to the hospital.  Summary  · Dizziness is a feeling of unsteadiness or light-headedness. This condition can be caused by a number of things, including medicines, dehydration, or illness.  · Anyone can become dizzy, but dizziness is more common in older adults.  · Drink enough fluid to keep your urine clear or pale yellow. Do not drink alcohol.  · Avoid making quick movements if you feel dizzy. Monitor your dizziness for any changes.  This information is not intended to  replace advice given to you by your health care provider. Make sure you discuss any questions you have with your health care provider.  Document Revised: 12/21/2018 Document Reviewed: 01/20/2018  [employer] Patient Education © 2021 [employer]    Nausea, Adult  Nausea is the feeling that you have an upset stomach or that you are about to vomit. Nausea on its own is not usually a serious concern, but it may be an early sign of a more serious medical problem. As nausea gets worse, it can lead to vomiting. If vomiting develops, or if you are not able to drink enough fluids, you are at risk of becoming dehydrated. Dehydration can make you tired and thirsty, cause you to have a dry mouth, and decrease how often you urinate. Older adults and people with other diseases or a weak disease-fighting system (immune system) are at higher risk for dehydration. The main goals of treating your nausea are:  · To relieve your nausea.  · To limit repeated nausea episodes.  · To prevent vomiting and dehydration.  Follow these instructions at home:  Watch your symptoms for any changes. Tell your health care provider about them. Follow these instructions as told by your health care provider.  Eating and drinking         · Take an oral rehydration solution (ORS). This is a drink that is sold at pharmacies and retail stores.  · Drink clear fluids slowly and in small amounts as you are able. Clear fluids include water, ice chips, low-calorie sports drinks, and fruit juice that has water added (diluted fruit juice).  · Eat bland, easy-to-digest foods in small amounts as you are able. These foods include bananas, applesauce, rice, lean meats, toast, and crackers.  · Avoid drinking fluids that contain a lot of sugar or caffeine, such as energy drinks, sports drinks, and soda.  · Avoid alcohol.  · Avoid spicy or fatty foods.  General instructions  · Take over-the-counter and prescription medicines only as told by your health care  provider.  · Rest at home while you recover.  · Drink enough fluid to keep your urine pale yellow.  · Breathe slowly and deeply when you feel nauseous.  · Avoid smelling things that have strong odors.  · Wash your hands often using soap and water. If soap and water are not available, use hand .  · Make sure that all people in your household wash their hands well and often.  · Keep all follow-up visits as told by your health care provider. This is important.  Contact a health care provider if:  · Your nausea gets worse.  · Your nausea does not go away after two days.  · You vomit.  · You cannot drink fluids without vomiting.  · You have any of the following:  ? New symptoms.  ? A fever.  ? A headache.  ? Muscle cramps.  ? A rash.  ? Pain while urinating.  · You feel light-headed or dizzy.  Get help right away if:  · You have pain in your chest, neck, arm, or jaw.  · You feel extremely weak or you faint.  · You have vomit that is bright red or looks like coffee grounds.  · You have bloody or black stools or stools that look like tar.  · You have a severe headache, a stiff neck, or both.  · You have severe pain, cramping, or bloating in your abdomen.  · You have difficulty breathing or are breathing very quickly.  · Your heart is beating very quickly.  · Your skin feels cold and clammy.  · You feel confused.  · You have signs of dehydration, such as:  ? Dark urine, very little urine, or no urine.  ? Cracked lips.  ? Dry mouth.  ? Sunken eyes.  ? Sleepiness.  ? Weakness.  These symptoms may represent a serious problem that is an emergency. Do not wait to see if the symptoms will go away. Get medical help right away. Call your local emergency services (911 in the [place] Do not drive yourself to the hospital.  Summary  · Nausea is the feeling that you have an upset stomach or that you are about to vomit. Nausea on its own is not usually a serious concern, but it may be an early sign of a more serious medical  problem.  · If vomiting develops, or if you are not able to drink enough fluids, you are at risk of becoming dehydrated.  · Follow recommendations for eating and drinking and take over-the-counter and prescription medicines only as told by your health care provider.  · Contact a health care provider right away if your symptoms worsen or you have new symptoms.  · Keep all follow-up visits as told by your health care provider. This is important.  This information is not intended to replace advice given to you by your health care provider. Make sure you discuss any questions you have with your health care provider.  Document Revised: 11/17/2020 Document Reviewed: 05/28/2019  [employer] Patient Education © 2021 [employer]

## 2021-11-22 ENCOUNTER — OFFICE VISIT (OUTPATIENT)
Dept: FAMILY MEDICINE CLINIC | Facility: CLINIC | Age: 31
End: 2021-11-22

## 2021-11-22 VITALS
OXYGEN SATURATION: 98 % | WEIGHT: 129 LBS | RESPIRATION RATE: 20 BRPM | HEART RATE: 78 BPM | BODY MASS INDEX: 19.55 KG/M2 | DIASTOLIC BLOOD PRESSURE: 64 MMHG | TEMPERATURE: 98 F | HEIGHT: 68 IN | SYSTOLIC BLOOD PRESSURE: 106 MMHG

## 2021-11-22 DIAGNOSIS — R42 DIZZINESS: ICD-10-CM

## 2021-11-22 DIAGNOSIS — Z00.00 ANNUAL PHYSICAL EXAM: Primary | ICD-10-CM

## 2021-11-22 LAB
BASOPHILS # BLD AUTO: 0.04 10*3/MM3 (ref 0–0.2)
BASOPHILS NFR BLD AUTO: 1 % (ref 0–1.5)
DEPRECATED RDW RBC AUTO: 43.4 FL (ref 37–54)
EOSINOPHIL # BLD AUTO: 0.03 10*3/MM3 (ref 0–0.4)
EOSINOPHIL NFR BLD AUTO: 0.7 % (ref 0.3–6.2)
ERYTHROCYTE [DISTWIDTH] IN BLOOD BY AUTOMATED COUNT: 11.9 % (ref 12.3–15.4)
HCT VFR BLD AUTO: 43.1 % (ref 34–46.6)
HGB BLD-MCNC: 13.6 G/DL (ref 12–15.9)
IMM GRANULOCYTES # BLD AUTO: 0.01 10*3/MM3 (ref 0–0.05)
IMM GRANULOCYTES NFR BLD AUTO: 0.2 % (ref 0–0.5)
LYMPHOCYTES # BLD AUTO: 1.78 10*3/MM3 (ref 0.7–3.1)
LYMPHOCYTES NFR BLD AUTO: 42.6 % (ref 19.6–45.3)
MCH RBC QN AUTO: 30.9 PG (ref 26.6–33)
MCHC RBC AUTO-ENTMCNC: 31.6 G/DL (ref 31.5–35.7)
MCV RBC AUTO: 98 FL (ref 79–97)
MONOCYTES # BLD AUTO: 0.36 10*3/MM3 (ref 0.1–0.9)
MONOCYTES NFR BLD AUTO: 8.6 % (ref 5–12)
NEUTROPHILS NFR BLD AUTO: 1.96 10*3/MM3 (ref 1.7–7)
NEUTROPHILS NFR BLD AUTO: 46.9 % (ref 42.7–76)
NRBC BLD AUTO-RTO: 0 /100 WBC (ref 0–0.2)
PLATELET # BLD AUTO: 174 10*3/MM3 (ref 140–450)
PMV BLD AUTO: 12.3 FL (ref 6–12)
RBC # BLD AUTO: 4.4 10*6/MM3 (ref 3.77–5.28)
WBC NRBC COR # BLD: 4.18 10*3/MM3 (ref 3.4–10.8)

## 2021-11-22 PROCEDURE — 85025 COMPLETE CBC W/AUTO DIFF WBC: CPT | Performed by: FAMILY MEDICINE

## 2021-11-22 PROCEDURE — 99213 OFFICE O/P EST LOW 20 MIN: CPT | Performed by: FAMILY MEDICINE

## 2021-11-22 PROCEDURE — 99385 PREV VISIT NEW AGE 18-39: CPT | Performed by: FAMILY MEDICINE

## 2021-11-22 PROCEDURE — 84443 ASSAY THYROID STIM HORMONE: CPT | Performed by: FAMILY MEDICINE

## 2021-11-22 PROCEDURE — 80053 COMPREHEN METABOLIC PANEL: CPT | Performed by: FAMILY MEDICINE
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
## 2021-11-23 ENCOUNTER — PATIENT ROUNDING (BHMG ONLY) (OUTPATIENT)
Dept: FAMILY MEDICINE CLINIC | Facility: CLINIC | Age: 31
End: 2021-11-23

## 2021-11-23 LAB
ALBUMIN SERPL-MCNC: 5 G/DL (ref 3.5–5.2)
ALBUMIN/GLOB SERPL: 1.9 G/DL
ALP SERPL-CCNC: 54 U/L (ref 39–117)
ALT SERPL W P-5'-P-CCNC: 23 U/L (ref 1–33)
ANION GAP SERPL CALCULATED.3IONS-SCNC: 10.3 MMOL/L (ref 5–15)
AST SERPL-CCNC: 30 U/L (ref 1–32)
BILIRUB SERPL-MCNC: 0.6 MG/DL (ref 0–1.2)
BUN SERPL-MCNC: 19 MG/DL (ref 6–20)
BUN/CREAT SERPL: 22.4 (ref 7–25)
CALCIUM SPEC-SCNC: 10.1 MG/DL (ref 8.6–10.5)
CHLORIDE SERPL-SCNC: 104 MMOL/L (ref 98–107)
CO2 SERPL-SCNC: 26.7 MMOL/L (ref 22–29)
CREAT SERPL-MCNC: 0.85 MG/DL (ref 0.57–1)
GFR SERPL CREATININE-BSD FRML MDRD: 77 ML/MIN/1.73
GLOBULIN UR ELPH-MCNC: 2.7 GM/DL
GLUCOSE SERPL-MCNC: 80 MG/DL (ref 65–99)
POTASSIUM SERPL-SCNC: 3.9 MMOL/L (ref 3.5–5.2)
PROT SERPL-MCNC: 7.7 G/DL (ref 6–8.5)
SODIUM SERPL-SCNC: 141 MMOL/L (ref 136–145)
TSH SERPL DL<=0.05 MIU/L-ACNC: 2.6 UIU/ML (ref 0.27–4.2)

## 2021-11-23 NOTE — PROGRESS NOTES
November 23, 2021    Hello, may I speak with Damari Muller?    My name is Rowan Ramos     I am  with [facility]  [facility]  [address] [facility] [address]  [facility] [address] [phone].    Before we get started may I verify your date of birth? 1990    I am calling to officially welcome you to our practice and ask about your recent visit. Is this a good time to talk? yes    Tell me about your visit with us. What things went well?  yes  listened and understood issues well.        We're always looking for ways to make our patients' experiences even better. Do you have recommendations on ways we may improve?  no    Overall were you satisfied with your first visit to our practice? yes       I appreciate you taking the time to speak with me today. Is there anything else I can do for you? no      Thank you, and have a great day.

## 2021-11-24 ENCOUNTER — TELEPHONE (OUTPATIENT)
Dept: FAMILY MEDICINE CLINIC | Facility: CLINIC | Age: 31
End: 2021-11-24

## 2021-11-24 NOTE — TELEPHONE ENCOUNTER
Provider: FLORENCIO     Caller: LAWRENCE MUÑOZ     Phone Number: [phone]    Reason for Call: PATIENT STATED THAT DR. MATIAS TOLD HER TO CALL BACK AND FOLLOW UP AFTER GETTING TEST RESULTS.  PATIENT GOT HER TEST RESULTS TODAY AND IS ASKING WHAT IS THE NEXT STEP.

## 2021-12-01 ENCOUNTER — LAB (OUTPATIENT)
Dept: LAB | Facility: HOSPITAL | Age: 31
End: 2021-12-01

## 2021-12-01 ENCOUNTER — OFFICE VISIT (OUTPATIENT)
Dept: ENDOCRINOLOGY | Facility: CLINIC | Age: 31
End: 2021-12-01

## 2021-12-01 VITALS
WEIGHT: 130.4 LBS | BODY MASS INDEX: 19.76 KG/M2 | HEIGHT: 68 IN | OXYGEN SATURATION: 98 % | SYSTOLIC BLOOD PRESSURE: 108 MMHG | DIASTOLIC BLOOD PRESSURE: 62 MMHG | HEART RATE: 59 BPM

## 2021-12-01 DIAGNOSIS — E06.3 HYPOTHYROIDISM DUE TO HASHIMOTO'S THYROIDITIS: Primary | Chronic | ICD-10-CM

## 2021-12-01 DIAGNOSIS — E61.1 IRON DEFICIENCY: ICD-10-CM

## 2021-12-01 DIAGNOSIS — E55.9 VITAMIN D DEFICIENCY: Chronic | ICD-10-CM

## 2021-12-01 DIAGNOSIS — E03.8 HYPOTHYROIDISM DUE TO HASHIMOTO'S THYROIDITIS: Primary | Chronic | ICD-10-CM

## 2021-12-01 PROCEDURE — 82306 VITAMIN D 25 HYDROXY: CPT | Performed by: PHYSICIAN ASSISTANT

## 2021-12-01 PROCEDURE — 84439 ASSAY OF FREE THYROXINE: CPT | Performed by: PHYSICIAN ASSISTANT

## 2021-12-01 PROCEDURE — 82728 ASSAY OF FERRITIN: CPT | Performed by: PHYSICIAN ASSISTANT

## 2021-12-01 PROCEDURE — 99214 OFFICE O/P EST MOD 30 MIN: CPT | Performed by: PHYSICIAN ASSISTANT

## 2021-12-01 PROCEDURE — 84480 ASSAY TRIIODOTHYRONINE (T3): CPT | Performed by: PHYSICIAN ASSISTANT

## 2021-12-01 PROCEDURE — 84443 ASSAY THYROID STIM HORMONE: CPT | Performed by: PHYSICIAN ASSISTANT

## 2021-12-01 NOTE — PROGRESS NOTES
Chief Complaint:   Damari Muller is a 31 y.o. female who is being seen today for  Hypothyroidism .     HPI     31 y.o. female with hx of anxiety comes in today for f/u of hypothyroidism. Was diagnosed with hashimoto's in 2010 and started on supplement at that time.   Previous medication: Synthroid, levothyroxine, armour.  She has celiac disease as well as dye allergies - therefore requires Tirosint brand, on 75 mcg daily. Was previously on cytomel 5 mcg daily due to her chronic fatigue, but it was discontinued as it did not improve any of her symptoms.     ACTH stim test was normal 4/2021.     Had an episode of dizziness. Spinning sensation. Saw pcp for this. Still has occasional episodes but less than before.   Had an abnormal pap. Will have colpo.   Is fatigued. Not sleeping well. Depression and anxiety worse.   Hair loss is same.   Hx of anemia.   Work is going well. Has a new job. In a stable relationship.     The following portions of the patient's history were reviewed and updated by me as appropriate: allergies, current medications, past family history, past social history, past surgical history and problem list.    Review of Systems  Review of Systems   Constitutional: Positive for fatigue.   Skin:        Hair loss - stable   Psychiatric/Behavioral: Positive for sleep disturbance.        Anxiety, depression   All other systems reviewed and are negative.       Current medications:  Current Outpatient Medications   Medication Sig Dispense Refill   • meclizine (ANTIVERT) 25 MG tablet Take 1 tablet by mouth 3 (Three) Times a Day As Needed for Dizziness. 30 tablet 0   • Tirosint 75 MCG capsule Take 1 capsule by mouth once daily 90 capsule 1     No current facility-administered medications for this visit.       Physical Exam   Vitals:    12/01/21 1314   BP: 108/62   Pulse: 59   SpO2: 98%   Body mass index is 19.83 kg/m².  Physical Exam   Constitutional: She is oriented to person, place, and time. She appears  well-developed. No distress.   HENT:   Head: Normocephalic.   Right Ear: External ear normal.   Left Ear: External ear normal.   Mouth/Throat: No oropharyngeal exudate.   Eyes: Conjunctivae and lids are normal. Right eye exhibits no discharge. Left eye exhibits no discharge. Right pupil is reactive. Left pupil is reactive.   Neck: No JVD present. No tracheal deviation present. No thyroid mass and no thyromegaly present.   Cardiovascular: Normal rate, regular rhythm and normal heart sounds.   No murmur heard.  Pulmonary/Chest: Effort normal and breath sounds normal. No respiratory distress. She has no wheezes.   Abdominal: Soft. Bowel sounds are normal. There is no abdominal tenderness.   Musculoskeletal: No tenderness.   Lymphadenopathy:     She has no cervical adenopathy.   Neurological: She is alert and oriented to person, place, and time.   Skin: Skin is warm and dry. No rash noted. She is not diaphoretic. No erythema.   Psychiatric: Her speech is normal and behavior is normal. Thought content normal.       Labs and Imaging   Lab Results   Component Value Date    TSH 2.600 11/22/2021    [id number] 136.0 08/16/2021       Assessment / Plan     Diagnoses and all orders for this visit:    1. Hypothyroidism due to Hashimoto's thyroiditis (Primary)  -     TSH  -     T4, Free  -     T3    2. Iron deficiency  -     Ferritin    3. Vitamin D deficiency  -     Vitamin D 25 Hydroxy        Problem List Items Addressed This Visit        Other    Hypothyroidism due to Hashimoto's thyroiditis - Primary (Chronic)    Relevant Orders    TSH    T4, Free    T3      Other Visit Diagnoses     Iron deficiency        Relevant Orders    Ferritin    Vitamin D deficiency  (Chronic)       Relevant Orders    Vitamin D 25 Hydroxy        Diagnosis was discussed and reviewed with the patient including the advantages of drug therapy.        1. Pt appears clinically euthyroid. Fatigue is likely multifactorial. Repeat TFTs. Continue Tirosint 75 mcg  daily unless labs dictate otherwise. Check vit d, ferritin.   2. Patient will return to our office in 6 months.  3. The risks and benefits of my recommendations, as well as other treatment options were discussed with the patient today. Questions were answered.      Efrain Siu PA-C

## 2021-12-02 LAB
25(OH)D3 SERPL-MCNC: 96.4 NG/ML (ref 30–100)
FERRITIN SERPL-MCNC: 50 NG/ML (ref 13–150)
T3 SERPL-MCNC: 88.1 NG/DL (ref 80–200)
T4 FREE SERPL-MCNC: 1.47 NG/DL (ref 0.93–1.7)
TSH SERPL DL<=0.05 MIU/L-ACNC: 2.32 UIU/ML (ref 0.27–4.2)

## 2021-12-06 DIAGNOSIS — E06.3 HYPOTHYROIDISM DUE TO HASHIMOTO'S THYROIDITIS: ICD-10-CM

## 2021-12-06 DIAGNOSIS — E03.8 HYPOTHYROIDISM DUE TO HASHIMOTO'S THYROIDITIS: ICD-10-CM

## 2021-12-06 RX ORDER — LEVOTHYROXINE SODIUM 75 UG/1
75 CAPSULE ORAL DAILY
Qty: 90 CAPSULE | Refills: 3 | Status: SHIPPED | OUTPATIENT
Start: 2021-12-06 | End: 2022-06-01 | Stop reason: DRUGHIGH

## 2022-06-01 ENCOUNTER — OFFICE VISIT (OUTPATIENT)
Dept: ENDOCRINOLOGY | Facility: CLINIC | Age: 32
End: 2022-06-01

## 2022-06-01 VITALS
SYSTOLIC BLOOD PRESSURE: 116 MMHG | WEIGHT: 134 LBS | DIASTOLIC BLOOD PRESSURE: 68 MMHG | BODY MASS INDEX: 20.31 KG/M2 | HEART RATE: 62 BPM | OXYGEN SATURATION: 99 % | HEIGHT: 68 IN

## 2022-06-01 DIAGNOSIS — E03.8 HYPOTHYROIDISM DUE TO HASHIMOTO'S THYROIDITIS: Primary | Chronic | ICD-10-CM

## 2022-06-01 DIAGNOSIS — E06.3 HYPOTHYROIDISM DUE TO HASHIMOTO'S THYROIDITIS: Primary | Chronic | ICD-10-CM

## 2022-06-01 PROCEDURE — 99213 OFFICE O/P EST LOW 20 MIN: CPT | Performed by: PHYSICIAN ASSISTANT

## 2022-06-01 RX ORDER — PROGESTERONE 100 MG/1
150 CAPSULE ORAL DAILY
COMMUNITY
End: 2023-01-06

## 2022-06-01 RX ORDER — LEVOTHYROXINE SODIUM 88 UG/1
88 CAPSULE ORAL DAILY
Qty: 90 CAPSULE | Refills: 0 | Status: SHIPPED | OUTPATIENT
Start: 2022-06-01 | End: 2022-08-23 | Stop reason: DRUGHIGH

## 2022-06-01 NOTE — PROGRESS NOTES
Chief Complaint:   Damari Muller is a 32 y.o. female who is being seen today for  Hypothyroidism .     HPI     32 y.o. female with hx of anxiety comes in today for f/u of hypothyroidism. Was diagnosed with hashimoto's in 2010 and started on supplement at that time.   Previous medication: Synthroid, levothyroxine, armour.  She has celiac disease as well as dye allergies - therefore requires Tirosint brand, on 75 mcg daily. Was previously on cytomel 5 mcg daily due to her chronic fatigue, but it was discontinued as it did not improve any of her symptoms.   ACT stim test normal in the past.     Continue to have poor energy.   Was put back on progesterone by a wellness clinic.  They check TSH about 2 weeks ago and it was up to over 4.   She feels better with a lower TSH.      The following portions of the patient's history were reviewed and updated by me as appropriate: allergies, current medications, past family history, past social history, past surgical history and problem list.    Review of Systems  Review of Systems   Constitutional: Positive for fatigue.   All other systems reviewed and are negative.       Current medications:  Current Outpatient Medications   Medication Sig Dispense Refill   • Progesterone (PROMETRIUM) 100 MG capsule Take 150 mg by mouth Daily.     • Tirosint 88 MCG capsule Take 1 capsule by mouth Daily. 90 capsule 0     No current facility-administered medications for this visit.       Physical Exam   Vitals:    06/01/22 1634   BP: 116/68   Pulse: 62   SpO2: 99%   Body mass index is 20.37 kg/m².  Physical Exam   Constitutional: She is oriented to person, place, and time. She appears well-developed. No distress.   HENT:   Head: Normocephalic.   Right Ear: External ear normal.   Left Ear: External ear normal.   Mouth/Throat: No oropharyngeal exudate.   Eyes: Conjunctivae and lids are normal. Right eye exhibits no discharge. Left eye exhibits no discharge. Right pupil is reactive. Left pupil  is reactive.   Neck: No JVD present. No tracheal deviation present. No thyroid mass and no thyromegaly present.   Cardiovascular: Normal rate, regular rhythm and normal heart sounds.   No murmur heard.  Pulmonary/Chest: Effort normal and breath sounds normal. No respiratory distress. She has no wheezes.   Abdominal: Soft. Bowel sounds are normal. There is no abdominal tenderness.   Musculoskeletal: No tenderness.   Lymphadenopathy:     She has no cervical adenopathy.   Neurological: She is alert and oriented to person, place, and time.   Skin: Skin is warm and dry. No rash noted. She is not diaphoretic. No erythema.   Psychiatric: Her speech is normal and behavior is normal. Thought content normal.       Labs and Imaging   Lab Results   Component Value Date    TSH 2.320 12/01/2021    A5MSKFD 88.1 12/01/2021   external labs reviewed  5/13/2022   tsh 4.37    Assessment / Plan     Diagnoses and all orders for this visit:    1. Hypothyroidism due to Hashimoto's thyroiditis (Primary)  -     TSH  -     T4, Free  -     Tirosint 88 MCG capsule; Take 1 capsule by mouth Daily.  Dispense: 90 capsule; Refill: 0        Problem List Items Addressed This Visit        Other    Hypothyroidism due to Hashimoto's thyroiditis - Primary (Chronic)    Relevant Medications    Tirosint 88 MCG capsule    Other Relevant Orders    TSH    T4, Free        Diagnosis was discussed and reviewed with the patient including the advantages of drug therapy.        1. Pt appears clinically hypothyroid with fatigue. Based on recent TSH increase Tirosint to 88 mcg daily. Pt to let me know if she develops palpitations, tremors, or restlessness. Repeat labs in 8 weeks. Orders placed in chart.   2. Patient will return to our office in 6 months.  3. The risks and benefits of my recommendations, as well as other treatment options were discussed with the patient today. Questions were answered.      Efrain Siu PA-C

## 2022-07-26 ENCOUNTER — TELEPHONE (OUTPATIENT)
Dept: ENDOCRINOLOGY | Facility: CLINIC | Age: 32
End: 2022-07-26

## 2022-07-26 DIAGNOSIS — R00.2 PALPITATIONS: Primary | ICD-10-CM

## 2022-07-26 NOTE — TELEPHONE ENCOUNTER
PT CALLED STATING TIROSINT 88 IS CAUSING HEART PALPITATIONS. SHE REQUESTED WE LOWER DOSE AND REACH OUT HER.

## 2022-07-26 NOTE — TELEPHONE ENCOUNTER
I recommend repeating labs first. Orders are in her chart.   Decrease caffeine intake if consuming caffeine. Increase water intake.   Also added labs to check for anemia.

## 2022-08-18 ENCOUNTER — LAB (OUTPATIENT)
Dept: LAB | Facility: HOSPITAL | Age: 32
End: 2022-08-18

## 2022-08-18 DIAGNOSIS — R00.2 PALPITATIONS: ICD-10-CM

## 2022-08-18 LAB
BASOPHILS # BLD AUTO: 0.02 10*3/MM3 (ref 0–0.2)
BASOPHILS NFR BLD AUTO: 0.4 % (ref 0–1.5)
DEPRECATED RDW RBC AUTO: 42.9 FL (ref 37–54)
EOSINOPHIL # BLD AUTO: 0.06 10*3/MM3 (ref 0–0.4)
EOSINOPHIL NFR BLD AUTO: 1.1 % (ref 0.3–6.2)
ERYTHROCYTE [DISTWIDTH] IN BLOOD BY AUTOMATED COUNT: 12 % (ref 12.3–15.4)
FERRITIN SERPL-MCNC: 49.4 NG/ML (ref 13–150)
HCT VFR BLD AUTO: 41.8 % (ref 34–46.6)
HGB BLD-MCNC: 13.4 G/DL (ref 12–15.9)
IMM GRANULOCYTES # BLD AUTO: 0.01 10*3/MM3 (ref 0–0.05)
IMM GRANULOCYTES NFR BLD AUTO: 0.2 % (ref 0–0.5)
LYMPHOCYTES # BLD AUTO: 2.17 10*3/MM3 (ref 0.7–3.1)
LYMPHOCYTES NFR BLD AUTO: 39.2 % (ref 19.6–45.3)
MCH RBC QN AUTO: 31.4 PG (ref 26.6–33)
MCHC RBC AUTO-ENTMCNC: 32.1 G/DL (ref 31.5–35.7)
MCV RBC AUTO: 97.9 FL (ref 79–97)
MONOCYTES # BLD AUTO: 0.49 10*3/MM3 (ref 0.1–0.9)
MONOCYTES NFR BLD AUTO: 8.8 % (ref 5–12)
NEUTROPHILS NFR BLD AUTO: 2.79 10*3/MM3 (ref 1.7–7)
NEUTROPHILS NFR BLD AUTO: 50.3 % (ref 42.7–76)
NRBC BLD AUTO-RTO: 0 /100 WBC (ref 0–0.2)
PLATELET # BLD AUTO: 174 10*3/MM3 (ref 140–450)
PMV BLD AUTO: 11.7 FL (ref 6–12)
RBC # BLD AUTO: 4.27 10*6/MM3 (ref 3.77–5.28)
T4 FREE SERPL-MCNC: 1.36 NG/DL (ref 0.93–1.7)
TSH SERPL DL<=0.05 MIU/L-ACNC: 1.12 UIU/ML (ref 0.27–4.2)
WBC NRBC COR # BLD: 5.54 10*3/MM3 (ref 3.4–10.8)

## 2022-08-18 PROCEDURE — 84439 ASSAY OF FREE THYROXINE: CPT | Performed by: PHYSICIAN ASSISTANT

## 2022-08-18 PROCEDURE — 84443 ASSAY THYROID STIM HORMONE: CPT | Performed by: PHYSICIAN ASSISTANT

## 2022-08-18 PROCEDURE — 82728 ASSAY OF FERRITIN: CPT

## 2022-08-18 PROCEDURE — 85025 COMPLETE CBC W/AUTO DIFF WBC: CPT

## 2022-08-23 ENCOUNTER — TELEPHONE (OUTPATIENT)
Dept: ENDOCRINOLOGY | Facility: CLINIC | Age: 32
End: 2022-08-23

## 2022-08-23 ENCOUNTER — PRIOR AUTHORIZATION (OUTPATIENT)
Dept: ENDOCRINOLOGY | Facility: CLINIC | Age: 32
End: 2022-08-23

## 2022-08-23 DIAGNOSIS — E06.3 HYPOTHYROIDISM DUE TO HASHIMOTO'S THYROIDITIS: Primary | ICD-10-CM

## 2022-08-23 DIAGNOSIS — E03.8 HYPOTHYROIDISM DUE TO HASHIMOTO'S THYROIDITIS: Primary | ICD-10-CM

## 2022-08-23 RX ORDER — LEVOTHYROXINE SODIUM 75 UG/1
75 CAPSULE ORAL DAILY
Qty: 90 CAPSULE | Refills: 1 | Status: SHIPPED | OUTPATIENT
Start: 2022-08-23 | End: 2023-02-15 | Stop reason: SDUPTHER

## 2022-08-23 NOTE — TELEPHONE ENCOUNTER
Damari Muller Key: [id number] - PA Case ID: [id number] - Rx #: [id number] help? Call us at [phone]  Outcome  Approved today  Request Reference Number: [id number]. TIROSINT CAP 75MCG is approved through 08/23/2023. Your patient may now fill this prescription and it will be covered.  Drug  Tirosint 75MCG capsules  Form  [employer] Electronic Prior Authorization Form (2017 NCPDP)  Original Claim Info  70,76,19 Use levothyroxine or Synthroidor Member call # on ID cardUse Alt or MITZI VERMA`D Call [phone]-Formulary DrugMaximum Days Supply of 30Non-Formulary Drug*eVoucher*Denial Conversion Denied - 28 d

## 2022-08-23 NOTE — TELEPHONE ENCOUNTER
Decreased dose of Tirosint (75 mcg daily) sent. Repeat labs in 8 weeks. I put orders in her chart.   
Patient is requesting for a lower dosage on Tirosint. Patient requested 75mcg. Provider was to check blood work and get in touch with patient about the medication and patient is following up  
Spoke with pt and read her Vejamess note in regards to her labs. She verbalized understanding.     She c/o still having palpitations and hot flashes that are keeping her up at night. She is wanting to lower her tirosint dose.   
Spoke with pt and she verbalized understanding. She prefers to have labs mailed to her due to having a [facility] near her home. I printed out orders and are up front to be picked up.  
[id number] Comprehensive

## 2023-01-06 ENCOUNTER — OFFICE VISIT (OUTPATIENT)
Dept: BEHAVIORAL HEALTH | Facility: CLINIC | Age: 33
End: 2023-01-06
Payer: COMMERCIAL

## 2023-01-06 VITALS
HEIGHT: 68 IN | DIASTOLIC BLOOD PRESSURE: 75 MMHG | BODY MASS INDEX: 20.31 KG/M2 | SYSTOLIC BLOOD PRESSURE: 126 MMHG | WEIGHT: 134 LBS

## 2023-01-06 DIAGNOSIS — F43.23 ADJUSTMENT DISORDER WITH MIXED ANXIETY AND DEPRESSED MOOD: Primary | ICD-10-CM

## 2023-01-06 PROCEDURE — 90792 PSYCH DIAG EVAL W/MED SRVCS: CPT
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
## 2023-01-10 ENCOUNTER — TELEPHONE (OUTPATIENT)
Dept: BEHAVIORAL HEALTH | Facility: CLINIC | Age: 33
End: 2023-01-10
Payer: COMMERCIAL

## 2023-01-10 DIAGNOSIS — G47.00 INSOMNIA, UNSPECIFIED TYPE: Primary | ICD-10-CM

## 2023-01-10 RX ORDER — TRAZODONE HYDROCHLORIDE 50 MG/1
50 TABLET ORAL NIGHTLY
Qty: 30 TABLET | Refills: 1 | Status: SHIPPED | OUTPATIENT
Start: 2023-01-10

## 2023-01-10 NOTE — TELEPHONE ENCOUNTER
Called patient back regarding continued insomnia and difficulty with sustained sleep.    Patient reports with racing thoughts at night, frequent waking, and difficulty falling back asleep.    I will order trazodone 50 mg nightly.    Discussed with patient possible side effects and proper medication dosing.    Patient is to keep follow-up appointment with me as scheduled.    Thanks

## 2023-01-20 ENCOUNTER — OFFICE VISIT (OUTPATIENT)
Dept: BEHAVIORAL HEALTH | Facility: CLINIC | Age: 33
End: 2023-01-20
Payer: COMMERCIAL

## 2023-01-20 VITALS — WEIGHT: 134 LBS | BODY MASS INDEX: 20.31 KG/M2 | HEIGHT: 68 IN

## 2023-01-20 DIAGNOSIS — F43.23 ADJUSTMENT DISORDER WITH MIXED ANXIETY AND DEPRESSED MOOD: Primary | ICD-10-CM

## 2023-01-20 PROBLEM — F43.10 POST TRAUMATIC STRESS DISORDER (PTSD): Status: ACTIVE | Noted: 2023-01-20

## 2023-01-20 PROBLEM — F43.10 POST TRAUMATIC STRESS DISORDER (PTSD): Status: RESOLVED | Noted: 2023-01-20 | Resolved: 2023-01-20

## 2023-01-20 PROCEDURE — 90834 PSYTX W PT 45 MINUTES: CPT
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
## 2023-02-15 ENCOUNTER — OFFICE VISIT (OUTPATIENT)
Dept: FAMILY MEDICINE CLINIC | Facility: CLINIC | Age: 33
End: 2023-02-15
Payer: COMMERCIAL

## 2023-02-15 VITALS
OXYGEN SATURATION: 98 % | HEART RATE: 80 BPM | SYSTOLIC BLOOD PRESSURE: 110 MMHG | WEIGHT: 134.4 LBS | BODY MASS INDEX: 20.37 KG/M2 | DIASTOLIC BLOOD PRESSURE: 74 MMHG | HEIGHT: 68 IN

## 2023-02-15 DIAGNOSIS — F41.9 ANXIETY: ICD-10-CM

## 2023-02-15 DIAGNOSIS — E03.8 HYPOTHYROIDISM DUE TO HASHIMOTO'S THYROIDITIS: Primary | Chronic | ICD-10-CM

## 2023-02-15 DIAGNOSIS — Z00.00 ENCOUNTER FOR MEDICAL EXAMINATION TO ESTABLISH CARE: ICD-10-CM

## 2023-02-15 DIAGNOSIS — E06.3 HYPOTHYROIDISM DUE TO HASHIMOTO'S THYROIDITIS: Primary | Chronic | ICD-10-CM

## 2023-02-15 DIAGNOSIS — Z12.31 ENCOUNTER FOR SCREENING MAMMOGRAM FOR MALIGNANT NEOPLASM OF BREAST: ICD-10-CM

## 2023-02-15 DIAGNOSIS — K90.0 CELIAC DISEASE: ICD-10-CM

## 2023-02-15 DIAGNOSIS — G47.9 SLEEP DIFFICULTIES: ICD-10-CM

## 2023-02-15 DIAGNOSIS — Z80.3 FAMILY HISTORY OF BREAST CANCER IN MOTHER: ICD-10-CM

## 2023-02-15 PROCEDURE — 99214 OFFICE O/P EST MOD 30 MIN: CPT | Performed by: STUDENT IN AN ORGANIZED HEALTH CARE EDUCATION/TRAINING PROGRAM

## 2023-02-15 RX ORDER — BUSPIRONE HYDROCHLORIDE 5 MG/1
5 TABLET ORAL 3 TIMES DAILY
Qty: 90 TABLET | Refills: 0 | Status: SHIPPED | OUTPATIENT
Start: 2023-02-15 | End: 2023-03-17

## 2023-02-15 RX ORDER — LEVOTHYROXINE SODIUM 75 UG/1
75 CAPSULE ORAL DAILY
Qty: 90 CAPSULE | Refills: 1 | Status: SHIPPED | OUTPATIENT
Start: 2023-02-15

## 2023-02-15 NOTE — PROGRESS NOTES
New Patient Office Visit      Patient Name: Damari Muller  : 1990   MRN: [id number]   Care Team: Patient Care Team:  Ivelisse Douglass DO as PCP - General (Internal Medicine)  Efrain Siu PA-C as Physician Assistant (Endocrinology)    Chief Complaint:    Chief Complaint   Patient presents with   • new patient preventative medicine service       History of Present Illness: Damari Muller is a 32 y.o. female with hypothyroidism, celiac, who is here today to establish care.  Previously following with Dr. Alex Wright. She has her doctorate in social science. She is feeling well today, no acute complaints.    Hypothyroidism - following with endocrinology, Efrain Siu PA-C. Taking Tirosint 75mcg daily (has not been able to take synthroid due to additives/gluten). Has been on Tirosint for 2 years. Reports having thyroid labs at outside lab within the past month - reports normal.     Following with Behavioral Health, Alex VALENUZELA for adjustment disorder with depression/anxiety, sleep difficulty. Taking trazodone and has been referred for talk therapy. Reports uncontrolled anxiety while traveling/flying.      Following with [facility] for GYN needs - Dr. Krista De Guzman. Wants to transition to having pap and women's health exams with primary care. Reports normal menstrual cycles.   Mother had breast cancer diagnosed age 30. Patient has completed BRCA testing and negative.     Subjective      Review of Systems:   Review of Systems - See HPI    Past Medical History:   Past Medical History:   Diagnosis Date   • Allergic     Celiac   • Anemia    • Celiac disease    • GERD (gastroesophageal reflux disease)    • Hypothyroidism    • Migraine    • Scoliosis    • Thyroid disease        Past Surgical History:   Past Surgical History:   Procedure Laterality Date   • COLONOSCOPY     • LEEP     • WISDOM TOOTH EXTRACTION         Family History:   Family History   Problem Relation  "Age of Onset   • Migraines Mother    • Breast cancer Mother 30   • Cancer Mother    • Cancer Maternal Grandmother    • Migraines Maternal Grandmother    • Thyroid disease Maternal Grandmother    • Ovarian cancer Maternal Grandmother 80   • Cancer Maternal Grandfather        Social History:   Social History     Socioeconomic History   • Marital status: Single   Tobacco Use   • Smoking status: Never   • Smokeless tobacco: Never   Substance and Sexual Activity   • Alcohol use: Yes     Comment: occa   • Drug use: Never   • Sexual activity: Yes     Birth control/protection: Other, None       Tobacco History:   Social History     Tobacco Use   Smoking Status Never   Smokeless Tobacco Never       Medications:     Current Outpatient Medications:   •  Tirosint 75 MCG capsule, Take 1 capsule by mouth Daily. Dose decrease, Disp: 90 capsule, Rfl: 1  •  traZODone (DESYREL) 50 MG tablet, Take 1 tablet by mouth Every Night., Disp: 30 tablet, Rfl: 1  •  busPIRone (BUSPAR) 5 MG tablet, Take 1 tablet by mouth 3 (Three) Times a Day., Disp: 90 tablet, Rfl: 0    Allergies:   Allergies   Allergen Reactions   • Corn-Containing Products GI Intolerance   • Dairy Aid [Tilactase] GI Intolerance   • Dye Fdc Red [Red Dye] GI Intolerance   • Gluten Meal GI Intolerance   • Soybean-Containing Drug Products GI Intolerance       Objective     Physical Exam:  Vital Signs:   Vitals:    02/15/23 0931   BP: 110/74   Pulse: 80   SpO2: 98%   Weight: 61 kg (134 lb 6.4 oz)   Height: 172.7 cm (68\")     Body mass index is 20.44 kg/m².     Physical Exam  Vitals reviewed.   Constitutional:       Appearance: Normal appearance. She is normal weight. She is not ill-appearing.   Cardiovascular:      Rate and Rhythm: Normal rate and regular rhythm.      Pulses: Normal pulses.      Heart sounds: Normal heart sounds.   Pulmonary:      Effort: Pulmonary effort is normal. No respiratory distress.      Breath sounds: Normal breath sounds. No wheezing.   Skin:     " General: Skin is warm and dry.   Neurological:      Mental Status: She is alert.   Psychiatric:         Mood and Affect: Mood normal.         Behavior: Behavior normal.         Judgment: Judgment normal.         Assessment / Plan      Assessment/Plan:   Problems Addressed This Visit  Diagnoses and all orders for this visit:    1. Hypothyroidism due to Hashimoto's thyroiditis (Primary)  -     Tirosint 75 MCG capsule; Take 1 capsule by mouth Daily. Dose decrease  Dispense: 90 capsule; Refill: 1    Stable, had labs last month and will upload these to [facility] for independent review  Refill Tirosint 75mcg daily    2. Encounter for medical examination to establish care  Discussed importance of preventative care including vaccinations, age appropriate cancer screening, routine lab work, healthy diet, and active lifestyle.  Due for mammogram (family hx) - ordered today  Due for pap and routine labs - will return next visit.    3. Sleep difficulties  Taking trazodone prn, well controlled     4. Anxiety  -     busPIRone (BUSPAR) 5 MG tablet; Take 1 tablet by mouth 3 (Three) Times a Day.  Dispense: 90 tablet; Refill: 0  Has appointment to establish with talk therapy this week  Anxiety is uncontrolled, particularly when traveling. Will try buspar 5mg TID prn anxiety    5. Encounter for screening mammogram for malignant neoplasm of breast  -     Mammo Screening Digital Tomosynthesis Bilateral With CAD; Future    6. Family history of breast cancer in mother  -     Mammo Screening Digital Tomosynthesis Bilateral With CAD; Future          Plan of care reviewed with patient at the conclusion of today's visit. Education was provided regarding diagnosis and management.  Patient verbalizes understanding of and agreement with management plan.      Follow Up:   Return in about 2 months (around 4/15/2023) for Annual with pap and fasting lab.          DO RENEE Castaneda RD  [facility] PRIMARY  [facility]  [address] [facility] [address]  Fax [phone]  Phone [phone]

## 2023-02-17 ENCOUNTER — OFFICE VISIT (OUTPATIENT)
Dept: BEHAVIORAL HEALTH | Facility: CLINIC | Age: 33
End: 2023-02-17
Payer: COMMERCIAL

## 2023-02-17 DIAGNOSIS — E06.3 HYPOTHYROIDISM DUE TO HASHIMOTO'S THYROIDITIS: Chronic | ICD-10-CM

## 2023-02-17 DIAGNOSIS — F43.23 ADJUSTMENT DISORDER WITH MIXED ANXIETY AND DEPRESSED MOOD: Primary | ICD-10-CM

## 2023-02-17 DIAGNOSIS — E03.8 HYPOTHYROIDISM DUE TO HASHIMOTO'S THYROIDITIS: Chronic | ICD-10-CM

## 2023-02-17 PROCEDURE — 90791 PSYCH DIAGNOSTIC EVALUATION: CPT | Performed by: SOCIAL WORKER

## 2023-02-17 RX ORDER — LEVOTHYROXINE SODIUM 75 UG/1
CAPSULE ORAL
Qty: 30 CAPSULE | OUTPATIENT
Start: 2023-02-17
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
## 2023-02-22 ENCOUNTER — HOSPITAL ENCOUNTER (OUTPATIENT)
Dept: MAMMOGRAPHY | Facility: HOSPITAL | Age: 33
Discharge: HOME | End: 2023-02-22
Admitting: STUDENT IN AN ORGANIZED HEALTH CARE EDUCATION/TRAINING PROGRAM
Payer: COMMERCIAL

## 2023-02-22 DIAGNOSIS — Z12.31 ENCOUNTER FOR SCREENING MAMMOGRAM FOR MALIGNANT NEOPLASM OF BREAST: ICD-10-CM

## 2023-02-22 DIAGNOSIS — Z80.3 FAMILY HISTORY OF BREAST CANCER IN MOTHER: ICD-10-CM

## 2023-02-22 PROCEDURE — 77063 BREAST TOMOSYNTHESIS BI: CPT

## 2023-02-22 PROCEDURE — 77067 SCR MAMMO BI INCL CAD: CPT

## 2023-02-22 PROCEDURE — 77063 BREAST TOMOSYNTHESIS BI: CPT | Performed by: RADIOLOGY

## 2023-02-22 PROCEDURE — 77067 SCR MAMMO BI INCL CAD: CPT | Performed by: RADIOLOGY

## 2023-03-17 DIAGNOSIS — F41.9 ANXIETY: ICD-10-CM

## 2023-03-17 RX ORDER — BUSPIRONE HYDROCHLORIDE 5 MG/1
TABLET ORAL
Qty: 90 TABLET | Refills: 0 | Status: SHIPPED | OUTPATIENT
Start: 2023-03-17

## 2023-03-27 ENCOUNTER — LAB (OUTPATIENT)
Dept: LAB | Facility: HOSPITAL | Age: 33
End: 2023-03-27
Payer: COMMERCIAL

## 2023-03-27 ENCOUNTER — OFFICE VISIT (OUTPATIENT)
Dept: FAMILY MEDICINE CLINIC | Facility: CLINIC | Age: 33
End: 2023-03-27
Payer: COMMERCIAL

## 2023-03-27 VITALS
HEART RATE: 58 BPM | DIASTOLIC BLOOD PRESSURE: 70 MMHG | SYSTOLIC BLOOD PRESSURE: 116 MMHG | BODY MASS INDEX: 20.31 KG/M2 | HEIGHT: 68 IN | OXYGEN SATURATION: 99 % | WEIGHT: 134 LBS

## 2023-03-27 DIAGNOSIS — K21.9 GASTROESOPHAGEAL REFLUX DISEASE, UNSPECIFIED WHETHER ESOPHAGITIS PRESENT: ICD-10-CM

## 2023-03-27 DIAGNOSIS — R10.13 DYSPEPSIA: Primary | ICD-10-CM

## 2023-03-27 DIAGNOSIS — R10.13 DYSPEPSIA: ICD-10-CM

## 2023-03-27 DIAGNOSIS — F41.9 ANXIETY: ICD-10-CM

## 2023-03-27 PROCEDURE — 83013 H PYLORI (C-13) BREATH: CPT

## 2023-03-27 PROCEDURE — 99213 OFFICE O/P EST LOW 20 MIN: CPT | Performed by: STUDENT IN AN ORGANIZED HEALTH CARE EDUCATION/TRAINING PROGRAM

## 2023-03-27 NOTE — PROGRESS NOTES
Established Office Visit      Patient Name: Damari Muller  : 1990   MRN: [id number]   Care Team: Patient Care Team:  Ivelisse Douglass DO as PCP - General (Internal Medicine)  Efrain Siu PA-C as Physician Assistant (Endocrinology)    Chief Complaint:    Chief Complaint   Patient presents with   • GI Problem   • Anxiety       History of Present Illness: Damari Muller is a 32 y.o. female with hypothyroidism, celiac disease, anxiety/depression who is here today to follow up with a few medical concerns as detailed below.    Anxiety reports anxiety coming and going, which heightens with travel. She has been following with talk therapy but unfortunately has not had a great experience with this. Looking to find local EMDR therapy. Has not tried buspar. Ultimately would like to avoid medications but agreeable to prn use if needed.     Reports return of dyspepsia, belching, irritation, nausea she has been diagnosed with GERD in the past. She has been making dietary modifications without relief.     Subjective      Review of Systems:   Review of Systems - See HPI    I have reviewed and the following portions of the patient's history were updated as appropriate: past family history, past medical history, past social history, past surgical history and problem list.    Medications:     Current Outpatient Medications:   •  busPIRone (BUSPAR) 5 MG tablet, TAKE ONE TABLET BY MOUTH THREE TIMES A DAY, Disp: 90 tablet, Rfl: 0  •  Tirosint 75 MCG capsule, Take 1 capsule by mouth Daily. Dose decrease, Disp: 90 capsule, Rfl: 1  •  traZODone (DESYREL) 50 MG tablet, Take 1 tablet by mouth Every Night., Disp: 30 tablet, Rfl: 1    Allergies:   Allergies   Allergen Reactions   • Corn-Containing Products GI Intolerance   • Dairy Aid [Tilactase] GI Intolerance   • Dye Fdc Red [Red Dye] GI Intolerance   • Gluten Meal GI Intolerance   • Soybean-Containing Drug Products GI Intolerance       Objective     Physical  "Exam:  Vital Signs:   Vitals:    03/27/23 0826   BP: 116/70   Pulse: 58   SpO2: 99%   Weight: 60.8 kg (134 lb)   Height: 172.7 cm (68\")     Body mass index is 20.37 kg/m².     Physical Exam  Vitals reviewed.   Constitutional:       Appearance: Normal appearance. She is not ill-appearing.   Cardiovascular:      Rate and Rhythm: Normal rate.   Pulmonary:      Effort: Pulmonary effort is normal. No respiratory distress.   Skin:     General: Skin is warm and dry.   Neurological:      Mental Status: She is alert.   Psychiatric:         Mood and Affect: Mood normal.         Assessment / Plan      Assessment/Plan:   Problems Addressed This Visit  Diagnoses and all orders for this visit:    1. Dyspepsia (Primary)  2. Gastroesophageal reflux disease, unspecified whether esophagitis present  -     H. Pylori Breath Test - Breath, Lung; Future  Discussed lifestyle/dietary modifications which she is well aware of  She is not interested in PPI or H2 blocker trial as she prefers to avoid medications if possible  Rule out H pylori today  If negative, she would like to pursue EGD  We also discussed her underlying celiac disease and importance of remaining gluten free along with avoiding cross contamination as much as possible. She understands this.     3. Anxiety  Uncontrolled - interested in EMDR therapy and I provided her with information to reach out to [facility]. She also has Buspar 5mg TID prn at home which she has not tried. We discussed indications of use, potential side effects, and frequency of med administration. She is interested in trying as needed but ultimately would like to avoid long term medication therapy.           Plan of care reviewed with patient at the conclusion of today's visit. Education was provided regarding diagnosis and management.  Patient verbalizes understanding of and agreement with management plan.    Follow Up: as currently scheduled for annual with pap in 2-3 weeks   No follow-ups on " file.        DO RENEE Castaneda RD  [facility]  [address]  [facility] [address]  Fax [phone]  Phone [phone]

## 2023-03-28 LAB — UREA BREATH TEST QL: NEGATIVE

## 2023-03-30 DIAGNOSIS — K90.0 CELIAC DISEASE: ICD-10-CM

## 2023-03-30 DIAGNOSIS — K21.9 GASTROESOPHAGEAL REFLUX DISEASE, UNSPECIFIED WHETHER ESOPHAGITIS PRESENT: ICD-10-CM

## 2023-03-30 DIAGNOSIS — R10.13 DYSPEPSIA: Primary | ICD-10-CM

## 2023-04-14 ENCOUNTER — OFFICE VISIT (OUTPATIENT)
Dept: FAMILY MEDICINE CLINIC | Facility: CLINIC | Age: 33
End: 2023-04-14
Payer: COMMERCIAL

## 2023-04-14 ENCOUNTER — LAB (OUTPATIENT)
Dept: LAB | Facility: HOSPITAL | Age: 33
End: 2023-04-14
Payer: COMMERCIAL

## 2023-04-14 VITALS
OXYGEN SATURATION: 99 % | HEIGHT: 68 IN | WEIGHT: 136.6 LBS | HEART RATE: 59 BPM | BODY MASS INDEX: 20.7 KG/M2 | SYSTOLIC BLOOD PRESSURE: 116 MMHG | DIASTOLIC BLOOD PRESSURE: 78 MMHG

## 2023-04-14 DIAGNOSIS — K90.0 CELIAC DISEASE: ICD-10-CM

## 2023-04-14 DIAGNOSIS — Z00.00 ANNUAL PHYSICAL EXAM: ICD-10-CM

## 2023-04-14 DIAGNOSIS — Z12.4 CERVICAL CANCER SCREENING: ICD-10-CM

## 2023-04-14 DIAGNOSIS — F41.9 ANXIETY: ICD-10-CM

## 2023-04-14 DIAGNOSIS — E06.3 HYPOTHYROIDISM DUE TO HASHIMOTO'S THYROIDITIS: Chronic | ICD-10-CM

## 2023-04-14 DIAGNOSIS — Z13.220 SCREENING FOR CHOLESTEROL LEVEL: ICD-10-CM

## 2023-04-14 DIAGNOSIS — E03.8 HYPOTHYROIDISM DUE TO HASHIMOTO'S THYROIDITIS: Chronic | ICD-10-CM

## 2023-04-14 DIAGNOSIS — Z00.00 ANNUAL PHYSICAL EXAM: Primary | ICD-10-CM

## 2023-04-14 DIAGNOSIS — Z11.59 ENCOUNTER FOR HEPATITIS C SCREENING TEST FOR LOW RISK PATIENT: ICD-10-CM

## 2023-04-14 DIAGNOSIS — Z80.3 FAMILY HISTORY OF BREAST CANCER IN MOTHER: ICD-10-CM

## 2023-04-14 LAB
ALBUMIN SERPL-MCNC: 4.7 G/DL (ref 3.5–5.2)
ALBUMIN/GLOB SERPL: 2 G/DL
ALP SERPL-CCNC: 60 U/L (ref 39–117)
ALT SERPL W P-5'-P-CCNC: 24 U/L (ref 1–33)
ANION GAP SERPL CALCULATED.3IONS-SCNC: 7.8 MMOL/L (ref 5–15)
AST SERPL-CCNC: 23 U/L (ref 1–32)
BILIRUB SERPL-MCNC: 0.4 MG/DL (ref 0–1.2)
BUN SERPL-MCNC: 20 MG/DL (ref 6–20)
BUN/CREAT SERPL: 19.2 (ref 7–25)
CALCIUM SPEC-SCNC: 10.5 MG/DL (ref 8.6–10.5)
CHLORIDE SERPL-SCNC: 105 MMOL/L (ref 98–107)
CHOLEST SERPL-MCNC: 179 MG/DL (ref 0–200)
CO2 SERPL-SCNC: 27.2 MMOL/L (ref 22–29)
CREAT SERPL-MCNC: 1.04 MG/DL (ref 0.57–1)
DEPRECATED RDW RBC AUTO: 48.4 FL (ref 37–54)
EGFRCR SERPLBLD CKD-EPI 2021: 72 ML/MIN/1.73
ERYTHROCYTE [DISTWIDTH] IN BLOOD BY AUTOMATED COUNT: 13.3 % (ref 12.3–15.4)
GLOBULIN UR ELPH-MCNC: 2.4 GM/DL
GLUCOSE SERPL-MCNC: 99 MG/DL (ref 65–99)
HCT VFR BLD AUTO: 40.5 % (ref 34–46.6)
HCV AB SER DONR QL: NORMAL
HDLC SERPL-MCNC: 114 MG/DL (ref 40–60)
HGB BLD-MCNC: 12.9 G/DL (ref 12–15.9)
LDLC SERPL CALC-MCNC: 57 MG/DL (ref 0–100)
LDLC/HDLC SERPL: 0.51 {RATIO}
MCH RBC QN AUTO: 32 PG (ref 26.6–33)
MCHC RBC AUTO-ENTMCNC: 31.9 G/DL (ref 31.5–35.7)
MCV RBC AUTO: 100.5 FL (ref 79–97)
PLATELET # BLD AUTO: 178 10*3/MM3 (ref 140–450)
PMV BLD AUTO: 11.4 FL (ref 6–12)
POTASSIUM SERPL-SCNC: 4.4 MMOL/L (ref 3.5–5.2)
PROT SERPL-MCNC: 7.1 G/DL (ref 6–8.5)
RBC # BLD AUTO: 4.03 10*6/MM3 (ref 3.77–5.28)
SODIUM SERPL-SCNC: 140 MMOL/L (ref 136–145)
T4 FREE SERPL-MCNC: 1.28 NG/DL (ref 0.93–1.7)
TRIGL SERPL-MCNC: 34 MG/DL (ref 0–150)
TSH SERPL DL<=0.05 MIU/L-ACNC: 4.48 UIU/ML (ref 0.27–4.2)
VLDLC SERPL-MCNC: 8 MG/DL (ref 5–40)
WBC NRBC COR # BLD: 4.53 10*3/MM3 (ref 3.4–10.8)

## 2023-04-14 PROCEDURE — 84439 ASSAY OF FREE THYROXINE: CPT

## 2023-04-14 PROCEDURE — 80061 LIPID PANEL: CPT

## 2023-04-14 PROCEDURE — 80050 GENERAL HEALTH PANEL: CPT

## 2023-04-14 PROCEDURE — 99395 PREV VISIT EST AGE 18-39: CPT | Performed by: STUDENT IN AN ORGANIZED HEALTH CARE EDUCATION/TRAINING PROGRAM

## 2023-04-14 PROCEDURE — 86803 HEPATITIS C AB TEST: CPT

## 2023-04-14 NOTE — PROGRESS NOTES
Physical Exam     Patient Name: Damari Muller  : 1990   MRN: [id number]   Care Team: Patient Care Team:  Ivelisse Douglass DO as PCP - General (Internal Medicine)  Efrain Siu PA-C as Physician Assistant (Endocrinology)    Chief Complaint:    Chief Complaint   Patient presents with   • Annual Exam       History of Present Illness: Damari Muller is a 33 y.o. female with hypothyroidism, celiac disease, anxiety/depression who is presents today for annual healthcare maintenance.    She reports abnormal pap several years ago s/p LEEP but last pap was normal. Due for pap today.     PHQ-2 Depression Screening  Little interest or pleasure in doing things? 0-->not at all   Feeling down, depressed, or hopeless? 0-->not at all   PHQ-2 Total Score 0       Health Maintenance   Topic Date Due   • HEPATITIS C SCREENING  Never done   • PAP SMEAR  Never done   • COVID-19 Vaccine (3 - Booster for Pfizer series) 2023 (Originally 2021)   • TDAP/TD VACCINES (1 - Tdap) 10/01/2025 (Originally 2007)   • INFLUENZA VACCINE  2023   • ANNUAL PHYSICAL  2024   • Pneumococcal Vaccine 0-64  Aged Out       Subjective      Review of Systems:   Review of Systems - See HPI    Past Medical History:   Past Medical History:   Diagnosis Date   • Allergic     Celiac   • Anemia    • Celiac disease    • GERD (gastroesophageal reflux disease)    • Hypothyroidism    • Migraine    • Scoliosis    • Thyroid disease        Past Surgical History:   Past Surgical History:   Procedure Laterality Date   • COLONOSCOPY     • LEEP     • WISDOM TOOTH EXTRACTION         Family History:   Family History   Problem Relation Age of Onset   • Migraines Mother    • Breast cancer Mother 30   • Cancer Mother    • Cancer Maternal Grandmother    • Migraines Maternal Grandmother    • Thyroid disease Maternal Grandmother    • Ovarian cancer Maternal Grandmother 80   • Cancer Maternal Grandfather        Social  "History:   Social History     Socioeconomic History   • Marital status: Single   Tobacco Use   • Smoking status: Never   • Smokeless tobacco: Never   Vaping Use   • Vaping Use: Never used   Substance and Sexual Activity   • Alcohol use: Not Currently     Comment: occa   • Drug use: Never   • Sexual activity: Yes     Birth control/protection: Other, None       Tobacco History:   Social History     Tobacco Use   Smoking Status Never   Smokeless Tobacco Never       Medications:     Current Outpatient Medications:   •  busPIRone (BUSPAR) 5 MG tablet, TAKE ONE TABLET BY MOUTH THREE TIMES A DAY, Disp: 90 tablet, Rfl: 0  •  Tirosint 75 MCG capsule, Take 1 capsule by mouth Daily. Dose decrease, Disp: 90 capsule, Rfl: 1  •  traZODone (DESYREL) 50 MG tablet, Take 1 tablet by mouth Every Night., Disp: 30 tablet, Rfl: 1    Allergies:   Allergies   Allergen Reactions   • Corn-Containing Products GI Intolerance   • Dairy Aid [Tilactase] GI Intolerance   • Dye Fdc Red [Red Dye] GI Intolerance   • Gluten Meal GI Intolerance   • Soybean-Containing Drug Products GI Intolerance       Past Medical History, Social History, Family History and Care Team were all reviewed with patient and updated as appropriate.       Objective     Physical Exam:  Vital Signs:   Vitals:    04/14/23 0808   BP: 116/78   Pulse: 59   SpO2: 99%   Weight: 62 kg (136 lb 9.6 oz)   Height: 172.7 cm (68\")     Body mass index is 20.77 kg/m².     Physical Exam  Vitals reviewed. Exam conducted with a chaperone present.   Constitutional:       Appearance: Normal appearance.   Cardiovascular:      Rate and Rhythm: Normal rate and regular rhythm.      Heart sounds: Normal heart sounds.   Pulmonary:      Effort: Pulmonary effort is normal. No respiratory distress.      Breath sounds: Normal breath sounds.   Genitourinary:     General: Normal vulva.      Vagina: Normal.      Cervix: Normal.   Skin:     General: Skin is warm and dry.   Neurological:      Mental Status: She " is alert.   Psychiatric:         Mood and Affect: Mood normal.         Behavior: Behavior normal.         Judgment: Judgment normal.         Assessment / Plan      Assessment/Plan:   Problems Addressed This Visit  Diagnoses and all orders for this visit:    1. Annual physical exam (Primary)  -     LIQUID-BASED PAP SMEAR WITH HPV GENOTYPING REGARDLESS OF INTERPRETATION (THOMPSON,COR,MAD); Future  -     CBC (No Diff); Future  -     Lipid Panel; Future  -     Comprehensive Metabolic Panel; Future  -     TSH; Future  -     Hepatitis C antibody; Future    2. Family history of breast cancer in mother    3. Hypothyroidism due to Hashimoto's thyroiditis  Comments:  Following with endocrinology - compliant with tirosint  Orders:  -     CBC (No Diff); Future  -     Comprehensive Metabolic Panel; Future  -     TSH; Future  -     T4, free; Future    4. Anxiety  -     TSH; Future    5. Screening for cholesterol level  -     Lipid Panel; Future    6. Cervical cancer screening  -     LIQUID-BASED PAP SMEAR WITH HPV GENOTYPING REGARDLESS OF INTERPRETATION (THOMPSON,COR,MAD); Future    7. Celiac disease  -     CBC (No Diff); Future    8. Encounter for hepatitis C screening test for low risk patient  -     Hepatitis C antibody; Future        Healthcare Maintenance   Vaccines:  -COVID19 booster due - politely declined  Otherwise vaccines UTD    Cancer Screening  -No indication for early colon cancer screening  -Annual mammogram UTD 2/2023 due to mothers family history of breast cancer in 30s (BRCA neg)  -Pap smear today with HPV cotesting    Other  -PHQ score 0  -Counseled on importance of maintaining healthy diet and routine exercise. Encouraged 150 min exercise per week.  -Tobacco cessation: not indicated, nonsmoker  -Contraception: none - open to idea of having children with long term partner. Declines STD screening. Menstrual cycles are regular.   -Blood pressure is at goal <130/80  -Metabolic screening today    Encouraged routine eye and  dental exams.     Plan of care reviewed with patient at the conclusion of today's visit. Education was provided regarding diagnosis and management.  Patient verbalizes understanding of and agreement with management plan.    Follow Up:   No follow-ups on file.        DO RENEE Castaneda RD  [facility]  [address]  [facility] [address]  Fax [phone]  Phone [phone]

## 2023-04-20 LAB — REF LAB TEST METHOD: NORMAL

## 2023-04-25 DIAGNOSIS — R87.610 ASCUS OF CERVIX WITH NEGATIVE HIGH RISK HPV: Primary | ICD-10-CM

## 2023-04-26 ENCOUNTER — PATIENT MESSAGE (OUTPATIENT)
Dept: FAMILY MEDICINE CLINIC | Facility: CLINIC | Age: 33
End: 2023-04-26
Payer: COMMERCIAL

## 2023-05-16 ENCOUNTER — OUTSIDE FACILITY SERVICE (OUTPATIENT)
Dept: GASTROENTEROLOGY | Facility: CLINIC | Age: 33
End: 2023-05-16
Payer: COMMERCIAL

## 2023-05-16 ENCOUNTER — LAB REQUISITION (OUTPATIENT)
Dept: LAB | Facility: HOSPITAL | Age: 33
End: 2023-05-16
Payer: COMMERCIAL

## 2023-05-16 DIAGNOSIS — R10.13 EPIGASTRIC PAIN: ICD-10-CM

## 2023-05-16 DIAGNOSIS — R14.0 ABDOMINAL DISTENSION (GASEOUS): ICD-10-CM

## 2023-05-16 DIAGNOSIS — K21.00 GASTRO-ESOPHAGEAL REFLUX DISEASE WITH ESOPHAGITIS, WITHOUT BLEEDING: ICD-10-CM

## 2023-05-16 DIAGNOSIS — K21.9 GASTRO-ESOPHAGEAL REFLUX DISEASE WITHOUT ESOPHAGITIS: ICD-10-CM

## 2023-05-16 DIAGNOSIS — K90.0 CELIAC DISEASE: ICD-10-CM

## 2023-05-16 PROCEDURE — 88305 TISSUE EXAM BY PATHOLOGIST: CPT

## 2023-05-17 LAB — REF LAB TEST METHOD: NORMAL

## 2023-05-18 ENCOUNTER — TELEPHONE (OUTPATIENT)
Dept: GASTROENTEROLOGY | Facility: CLINIC | Age: 33
End: 2023-05-18
Payer: COMMERCIAL

## 2023-05-18 NOTE — TELEPHONE ENCOUNTER
I called and left a message about the pathology.  I stated there was no evidence of active celiac sprue based on the biopsies.  There was also no evidence for H. pylori.  I stated there was evidence of reflux and medication could be started if she is not taking anything at this time.

## 2023-07-28 ENCOUNTER — PATIENT MESSAGE (OUTPATIENT)
Dept: FAMILY MEDICINE CLINIC | Facility: CLINIC | Age: 33
End: 2023-07-28

## 2023-07-28 ENCOUNTER — LAB (OUTPATIENT)
Dept: LAB | Facility: HOSPITAL | Age: 33
End: 2023-07-28
Payer: COMMERCIAL

## 2023-07-28 ENCOUNTER — OFFICE VISIT (OUTPATIENT)
Dept: FAMILY MEDICINE CLINIC | Facility: CLINIC | Age: 33
End: 2023-07-28
Payer: COMMERCIAL

## 2023-07-28 VITALS
DIASTOLIC BLOOD PRESSURE: 60 MMHG | HEART RATE: 64 BPM | HEIGHT: 68 IN | OXYGEN SATURATION: 99 % | BODY MASS INDEX: 20 KG/M2 | WEIGHT: 132 LBS | SYSTOLIC BLOOD PRESSURE: 108 MMHG

## 2023-07-28 DIAGNOSIS — F41.0 PANIC ATTACK: ICD-10-CM

## 2023-07-28 DIAGNOSIS — F41.9 ANXIETY: Primary | ICD-10-CM

## 2023-07-28 DIAGNOSIS — E03.8 HYPOTHYROIDISM DUE TO HASHIMOTO'S THYROIDITIS: ICD-10-CM

## 2023-07-28 DIAGNOSIS — E06.3 HYPOTHYROIDISM DUE TO HASHIMOTO'S THYROIDITIS: ICD-10-CM

## 2023-07-28 LAB
T4 FREE SERPL-MCNC: 1.6 NG/DL (ref 0.93–1.7)
TSH SERPL DL<=0.05 MIU/L-ACNC: 2.53 UIU/ML (ref 0.27–4.2)

## 2023-07-28 PROCEDURE — 99214 OFFICE O/P EST MOD 30 MIN: CPT | Performed by: STUDENT IN AN ORGANIZED HEALTH CARE EDUCATION/TRAINING PROGRAM

## 2023-07-28 PROCEDURE — 84443 ASSAY THYROID STIM HORMONE: CPT

## 2023-07-28 PROCEDURE — 84439 ASSAY OF FREE THYROXINE: CPT

## 2023-07-28 RX ORDER — HYDROXYZINE 50 MG/1
50 TABLET, FILM COATED ORAL 3 TIMES DAILY PRN
Qty: 30 TABLET | Refills: 0 | Status: SHIPPED | OUTPATIENT
Start: 2023-07-28

## 2023-07-28 RX ORDER — BUSPIRONE HYDROCHLORIDE 10 MG/1
10 TABLET ORAL 3 TIMES DAILY
Qty: 90 TABLET | Refills: 2 | Status: SHIPPED | OUTPATIENT
Start: 2023-07-28

## 2023-07-28 NOTE — PROGRESS NOTES
Established Office Visit      Patient Name: Damari Muller  : 1990   MRN: [id number]   Care Team: Patient Care Team:  Ivelisse Douglass DO as PCP - General (Internal Medicine)  Efrain Siu PA-C as Physician Assistant (Endocrinology)    Chief Complaint:    Chief Complaint   Patient presents with    Panic Attack     Last Saturday, wants to discuss medication        History of Present Illness: Damari Muller is a 33 y.o. female with hypothyroidism, celiac disease, anxiety/depression  who is here today to follow up with anxiety.     She reprots she was at the gym last week when she started having a panic attack. She got in her car to drive home and began to feel panic, palpitations, chest tightness, shortness of breath, overwhelmed. She called an ambulance and symptoms improved after an initial evaluation. She reports they did EKG and this was normal. She declined ER evaluation because she was feeling much better.     She reports having similar reaction when she had her LEEP involving local epinephrine. She required waiting room observation for over 2 hours until she was able to calm down. Symptoms were similar to her recent episode.    She has buspar 5mg on hand but does not feel this is helpful and doesn't take it regularly.       Subjective      Review of Systems:   Review of Systems - See HPI    I have reviewed and the following portions of the patient's history were updated as appropriate: past family history, past medical history, past social history, past surgical history and problem list.    Medications:     Current Outpatient Medications:     Tirosint 75 MCG capsule, Take 1 capsule by mouth Daily. Dose decrease, Disp: 90 capsule, Rfl: 1    busPIRone (BUSPAR) 10 MG tablet, Take 1 tablet by mouth 3 (Three) Times a Day., Disp: 90 tablet, Rfl: 2    hydrOXYzine (ATARAX) 50 MG tablet, Take 1 tablet by mouth 3 (Three) Times a Day As Needed (panic)., Disp: 30 tablet, Rfl: 0    Allergies:  "  Allergies   Allergen Reactions    Corn-Containing Products GI Intolerance    Dairy Aid [Tilactase] GI Intolerance    Dye Fdc Red [Red Dye] GI Intolerance    Gluten Meal GI Intolerance    Soybean-Containing Drug Products GI Intolerance       Objective     Physical Exam:  Vital Signs:   Vitals:    07/28/23 0834   BP: 108/60   Pulse: 64   SpO2: 99%   Weight: 59.9 kg (132 lb)   Height: 172.7 cm (68\")     Body mass index is 20.07 kg/m².     Physical Exam  Vitals reviewed.   Constitutional:       Appearance: Normal appearance.   Cardiovascular:      Rate and Rhythm: Normal rate.      Heart sounds: Normal heart sounds. No murmur heard.  Pulmonary:      Effort: Pulmonary effort is normal. No respiratory distress.      Breath sounds: Normal breath sounds. No wheezing.   Skin:     General: Skin is warm and dry.   Neurological:      Mental Status: She is alert.   Psychiatric:         Mood and Affect: Mood normal.         Behavior: Behavior normal.         Judgment: Judgment normal.       Assessment / Plan      Assessment/Plan:   Problems Addressed This Visit  Diagnoses and all orders for this visit:    1. Anxiety (Primary)  -     busPIRone (BUSPAR) 10 MG tablet; Take 1 tablet by mouth 3 (Three) Times a Day.  Dispense: 90 tablet; Refill: 2  -     hydrOXYzine (ATARAX) 50 MG tablet; Take 1 tablet by mouth 3 (Three) Times a Day As Needed (panic).  Dispense: 30 tablet; Refill: 0    Uncontrolled  Increase buspar to 10mg TID on scheduled basis and add hydroxyzine prn panic episodes   She is planning to reach out to talk therapy  We also discussed medications such as SSRI to help with both underlying anxiety and depression. We will revisit this in a few weeks if buspar is ineffective or if she feels depression symptoms are present.     2. Panic attack    3. Hypothyroidism due to Hashimoto's thyroiditis  -     TSH; Future  -     T4, free; Future  Recheck  TFTs today to rule out dysfunction        Plan of care reviewed with patient at " the conclusion of today's visit. Education was provided regarding diagnosis and management.  Patient verbalizes understanding of and agreement with management plan.    Follow Up:   Return in about 4 weeks (around 8/25/2023) for Recheck anxiety .        DO RENEE Castaneda RD  [facility]  [address]  [facility] [address]  Fax [phone]  Phone [phone]

## 2023-08-03 ENCOUNTER — TELEMEDICINE (OUTPATIENT)
Dept: FAMILY MEDICINE CLINIC | Facility: CLINIC | Age: 33
End: 2023-08-03
Payer: COMMERCIAL

## 2023-08-03 DIAGNOSIS — F41.9 ANXIETY: Primary | ICD-10-CM

## 2023-08-03 DIAGNOSIS — F43.23 ADJUSTMENT DISORDER WITH MIXED ANXIETY AND DEPRESSED MOOD: ICD-10-CM

## 2023-08-03 PROCEDURE — 99213 OFFICE O/P EST LOW 20 MIN: CPT | Performed by: STUDENT IN AN ORGANIZED HEALTH CARE EDUCATION/TRAINING PROGRAM

## 2023-08-03 RX ORDER — ESCITALOPRAM OXALATE 10 MG/1
10 TABLET ORAL DAILY
Qty: 30 TABLET | Refills: 5 | Status: SHIPPED | OUTPATIENT
Start: 2023-08-03

## 2023-08-16 DIAGNOSIS — E03.8 HYPOTHYROIDISM DUE TO HASHIMOTO'S THYROIDITIS: Chronic | ICD-10-CM

## 2023-08-16 DIAGNOSIS — E06.3 HYPOTHYROIDISM DUE TO HASHIMOTO'S THYROIDITIS: Chronic | ICD-10-CM

## 2023-08-16 RX ORDER — LEVOTHYROXINE SODIUM 75 UG/1
CAPSULE ORAL
Qty: 90 CAPSULE | Refills: 1 | Status: SHIPPED | OUTPATIENT
Start: 2023-08-16

## 2023-08-16 NOTE — TELEPHONE ENCOUNTER
Rx Refill Note  Requested Prescriptions     Pending Prescriptions Disp Refills    Tirosint 75 MCG capsule [Pharmacy Med Name: TIROSINT 75 MCG CAPSULE] 90 capsule 1     Sig: TAKE ONE CAPSULE BY MOUTH DAILY      Last office visit with prescribing clinician: 7/28/2023   Last telemedicine visit with prescribing clinician: 8/3/2023   Next office visit with prescribing clinician: 8/25/2023                         Would you like a call back once the refill request has been completed: [] Yes [] No    If the office needs to give you a call back, can they leave a voicemail: [] Yes [] No    Key Valdes MA  08/16/23, 08:00 EDT

## 2023-10-24 ENCOUNTER — OFFICE VISIT (OUTPATIENT)
Age: 33
End: 2023-10-24
Payer: COMMERCIAL

## 2023-10-24 VITALS
WEIGHT: 137.4 LBS | SYSTOLIC BLOOD PRESSURE: 118 MMHG | HEIGHT: 68 IN | OXYGEN SATURATION: 99 % | HEART RATE: 63 BPM | DIASTOLIC BLOOD PRESSURE: 72 MMHG | BODY MASS INDEX: 20.82 KG/M2

## 2023-10-24 DIAGNOSIS — F33.1 MODERATE EPISODE OF RECURRENT MAJOR DEPRESSIVE DISORDER: ICD-10-CM

## 2023-10-24 DIAGNOSIS — F41.1 GENERALIZED ANXIETY DISORDER WITH PANIC ATTACKS: Primary | ICD-10-CM

## 2023-10-24 DIAGNOSIS — Z79.899 ENCOUNTER FOR MEDICATION MANAGEMENT: ICD-10-CM

## 2023-10-24 DIAGNOSIS — F41.0 GENERALIZED ANXIETY DISORDER WITH PANIC ATTACKS: Primary | ICD-10-CM

## 2023-10-24 RX ORDER — PROPRANOLOL HYDROCHLORIDE 10 MG/1
10-20 TABLET ORAL 2 TIMES DAILY PRN
Qty: 60 TABLET | Refills: 0 | Status: SHIPPED | OUTPATIENT
Start: 2023-10-24

## 2023-10-24 RX ORDER — ESCITALOPRAM OXALATE 10 MG/1
TABLET ORAL
Qty: 61 TABLET | Refills: 0 | Status: SHIPPED | OUTPATIENT
Start: 2023-10-24 | End: 2023-11-25

## 2023-10-24 RX ORDER — ESCITALOPRAM OXALATE 10 MG/1
TABLET ORAL
Qty: 60 TABLET | Refills: 1 | Status: SHIPPED | OUTPATIENT
Start: 2023-10-24 | End: 2023-10-24 | Stop reason: SDUPTHER
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
## 2023-10-27 LAB
1OH-MIDAZOLAM UR QL SCN: NOT DETECTED NG/MG CREAT
6MAM UR QL SCN: NEGATIVE NG/ML
7AMINOCLONAZEPAM/CREAT UR: NOT DETECTED NG/MG CREAT
A-OH ALPRAZ/CREAT UR: NOT DETECTED NG/MG CREAT
A-OH-TRIAZOLAM/CREAT UR CFM: NOT DETECTED NG/MG CREAT
ACP UR QL CFM: NOT DETECTED
ALPRAZ/CREAT UR CFM: NOT DETECTED NG/MG CREAT
AMPHETAMINES UR QL SCN: NEGATIVE NG/ML
APAP UR QL SCN: NEGATIVE UG/ML
BARBITURATES UR QL SCN: NEGATIVE NG/ML
BENZODIAZ SCN METH UR: NEGATIVE
BUPRENORPHINE UR QL SCN: NEGATIVE
BUPRENORPHINE/CREAT UR: NOT DETECTED NG/MG CREAT
CANNABINOIDS UR QL SCN: NEGATIVE NG/ML
CARISOPRODOL UR QL: NEGATIVE NG/ML
CLONAZEPAM/CREAT UR CFM: NOT DETECTED NG/MG CREAT
COCAINE+BZE UR QL SCN: NEGATIVE NG/ML
CREAT UR-MCNC: 16 MG/DL
D-METHORPHAN UR-MCNC: NOT DETECTED NG/ML
D-METHORPHAN+LEVORPHANOL UR QL: NOT DETECTED
DESALKYLFLURAZ/CREAT UR: NOT DETECTED NG/MG CREAT
DIAZEPAM/CREAT UR: NOT DETECTED NG/MG CREAT
ETHANOL UR QL SCN: NEGATIVE G/DL
ETHANOL UR QL SCN: NEGATIVE NG/ML
FENTANYL CTO UR SCN-MCNC: NEGATIVE NG/ML
FENTANYL/CREAT UR: NOT DETECTED NG/MG CREAT
FLUNITRAZEPAM UR QL SCN: NOT DETECTED NG/MG CREAT
GABAPENTIN UR-MCNC: NEGATIVE UG/ML
HYPNOTICS UR QL SCN: NEGATIVE
KETAMINE UR QL: NOT DETECTED
LORAZEPAM/CREAT UR: NOT DETECTED NG/MG CREAT
MEPERIDINE UR QL SCN: NEGATIVE NG/ML
METHADONE UR QL SCN: NEGATIVE NG/ML
METHADONE+METAB UR QL SCN: NEGATIVE NG/ML
MIDAZOLAM/CREAT UR CFM: NOT DETECTED NG/MG CREAT
MISCELLANEOUS, UR: NEGATIVE
NORBUPRENORPHINE/CREAT UR: NOT DETECTED NG/MG CREAT
NORDIAZEPAM/CREAT UR: NOT DETECTED NG/MG CREAT
NORFENTANYL/CREAT UR: NOT DETECTED NG/MG CREAT
NORFLUNITRAZEPAM UR-MCNC: NOT DETECTED NG/MG CREAT
NORKETAMINE UR-MCNC: NOT DETECTED UG/ML
OPIATES UR SCN-MCNC: NEGATIVE NG/ML
OTHER HALLUCINOGENS UR: NEGATIVE
OXAZEPAM/CREAT UR: NOT DETECTED NG/MG CREAT
OXYCODONE CTO UR SCN-MCNC: NEGATIVE NG/ML
PCP UR QL SCN: NEGATIVE NG/ML
PRESCRIBED MEDICATIONS: ABNORMAL
PRESCRIBED MEDICATIONS: ABNORMAL
PROPOXYPH UR QL SCN: NEGATIVE NG/ML
TAPENTADOL CTO UR SCN-MCNC: NEGATIVE NG/ML
TEMAZEPAM/CREAT UR: NOT DETECTED NG/MG CREAT
TRAMADOL UR QL SCN: NEGATIVE NG/ML
ZALEPLON UR-MCNC: NOT DETECTED NG/ML
ZOLPIDEM PHENYL-4-CARB UR QL SCN: NOT DETECTED
ZOLPIDEM UR QL SCN: NOT DETECTED
ZOPICLONE-N-OXIDE UR-MCNC: NOT DETECTED NG/ML

## 2023-11-15 DIAGNOSIS — F33.1 MODERATE EPISODE OF RECURRENT MAJOR DEPRESSIVE DISORDER: ICD-10-CM

## 2023-11-15 DIAGNOSIS — F41.1 GENERALIZED ANXIETY DISORDER WITH PANIC ATTACKS: ICD-10-CM

## 2023-11-15 DIAGNOSIS — F41.0 GENERALIZED ANXIETY DISORDER WITH PANIC ATTACKS: ICD-10-CM

## 2023-11-15 RX ORDER — PROPRANOLOL HYDROCHLORIDE 10 MG/1
TABLET ORAL
Qty: 60 TABLET | Refills: 0 | Status: SHIPPED | OUTPATIENT
Start: 2023-11-15

## 2023-11-27 ENCOUNTER — OFFICE VISIT (OUTPATIENT)
Age: 33
End: 2023-11-27
Payer: COMMERCIAL

## 2023-11-27 VITALS
WEIGHT: 133.9 LBS | DIASTOLIC BLOOD PRESSURE: 70 MMHG | BODY MASS INDEX: 20.29 KG/M2 | SYSTOLIC BLOOD PRESSURE: 108 MMHG | HEIGHT: 68 IN | OXYGEN SATURATION: 98 % | HEART RATE: 75 BPM

## 2023-11-27 DIAGNOSIS — F41.0 GENERALIZED ANXIETY DISORDER WITH PANIC ATTACKS: ICD-10-CM

## 2023-11-27 DIAGNOSIS — F33.1 MODERATE EPISODE OF RECURRENT MAJOR DEPRESSIVE DISORDER: ICD-10-CM

## 2023-11-27 DIAGNOSIS — F41.1 GENERALIZED ANXIETY DISORDER WITH PANIC ATTACKS: ICD-10-CM

## 2023-11-27 PROCEDURE — 99214 OFFICE O/P EST MOD 30 MIN: CPT | Performed by: NURSE PRACTITIONER

## 2023-11-27 RX ORDER — ESCITALOPRAM OXALATE 10 MG/1
20 TABLET ORAL DAILY
Qty: 60 TABLET | Refills: 1 | Status: SHIPPED | OUTPATIENT
Start: 2023-11-27

## 2023-11-27 RX ORDER — MIRTAZAPINE 7.5 MG/1
7.5 TABLET, FILM COATED ORAL NIGHTLY
Qty: 30 TABLET | Refills: 1 | Status: SHIPPED | OUTPATIENT
Start: 2023-11-27

## 2023-11-27 RX ORDER — ESCITALOPRAM OXALATE 10 MG/1
TABLET ORAL
Qty: 61 TABLET | Refills: 0 | Status: SHIPPED | OUTPATIENT
Start: 2023-11-27 | End: 2023-11-27 | Stop reason: SDUPTHER

## 2023-11-27 RX ORDER — TRAZODONE HYDROCHLORIDE 50 MG/1
50 TABLET ORAL AS NEEDED
COMMUNITY
Start: 2023-01-13 | End: 2023-11-27

## 2023-11-27 NOTE — PROGRESS NOTES
"     Office  Follow Up Visit      Patient Name: Damari Muller  : 1990   MRN: [id number]     Referring Provider: Ivelisse Burnett DO    Chief Complaint:      ICD-10-CM ICD-9-CM   1. Generalized anxiety disorder with panic attacks  F41.1 300.02    F41.0 300.01   2. Moderate episode of recurrent major depressive disorder  F33.1 296.32      History of Present Illness:   Damari Muller is a 33 y.o. female who is here today for follow up related to anxiety and depression.  Patient states she is going through a \"hard break up\" and her depression is really bad. She is waiting to hear about an appraisal for house so she can move out. She states her her current circumstances make it difficult for her  to know if her medication is helpful. PHQ 19 last visit and 18 today.  She denies SI/HI. Reports she feels  less anxious overall, but she had a panic attack on . Libido is down.  Reports she has not yet tried propranolol.  \"Sleep has not been great but It's probably just the stress.\"  Reports she tried trazodone and finds it gives her vivid disturbing dreams.  Appetite is \"trash\". Reports loss of  appetite. No weight loss. .   Subjective      Review of Systems:   Review of Systems   Constitutional:  Positive for activity change, appetite change and fatigue.   Cardiovascular:  Positive for palpitations.   Psychiatric/Behavioral:  Positive for dysphoric mood and sleep disturbance. Negative for hallucinations, self-injury and suicidal ideas. The patient is nervous/anxious.    All other systems reviewed and are negative.      PHQ-9 Depression Screening  Little interest or pleasure in doing things? 3-->nearly every day   Feeling down, depressed, or hopeless? 3-->nearly every day   Trouble falling or staying asleep, or sleeping too much? 2-->more than half the days   Feeling tired or having little energy? 3-->nearly every day   Poor appetite or overeating? 3-->nearly every day   Feeling bad about " yourself - or that you are a failure or have let yourself or your family down? 1-->several days   Trouble concentrating on things, such as reading the newspaper or watching television? 2-->more than half the days   Moving or speaking so slowly that other people could have noticed? Or the opposite - being so fidgety or restless that you have been moving around a lot more than usual? 1-->several days   Thoughts that you would be better off dead, or of hurting yourself in some way? 0-->not at all   PHQ-9 Total Score 18   If you checked off any problems, how difficult have these problems made it for you to do your work, take care of things at home, or get along with other people? extremely difficult     MAXIMINO-7      Over the last two weeks, how often have you been bothered by the following problems?  Feeling nervous, anxious or on edge: More than half the days  Not being able to stop or control worrying: More than half the days  Worrying too much about different things: Several days  Trouble Relaxing: Nearly every day  Being so restless that it is hard to sit still: More than half the days  Becoming easily annoyed or irritable: Several days  Feeling afraid as if something awful might happen: Not at all  MAXIMINO 7 Total Score: 11  If you checked any problems, how difficult have these problems made it for you to do your work, take care of things at home, or get along with other people: Very difficult    Patient History:   The following portions of the patient's history were reviewed and updated as appropriate: allergies, current medications, past family history, past medical history, past social history, past surgical history and problem list.     Social:  Currently living with ex-partner.  Looking for new home    Medications:     Current Outpatient Medications:     escitalopram (Lexapro) 10 MG tablet, Take 1.5 tablets by mouth Daily for 7 days, THEN 2 tablets Daily for 25 days., Disp: 61 tablet, Rfl: 0    hydrOXYzine (ATARAX)  "50 MG tablet, Take 1 tablet by mouth 3 (Three) Times a Day As Needed (panic)., Disp: 30 tablet, Rfl: 0    propranolol (INDERAL) 10 MG tablet, TAKE 1 TO 2 TABLETS BY MOUTH TWICE A DAY AS NEEDED FOR ANXIETY/PANIC, Disp: 60 tablet, Rfl: 0    Tirosint 75 MCG capsule, TAKE ONE CAPSULE BY MOUTH DAILY, Disp: 90 capsule, Rfl: 1    traZODone (DESYREL) 50 MG tablet, Take 1 tablet by mouth As Needed for Sleep., Disp: , Rfl:     Objective     Physical Exam:  Vital Signs:   Vitals:    11/27/23 0759   BP: 108/70   Pulse: 75   SpO2: 98%   Weight: 60.7 kg (133 lb 14.4 oz)   Height: 172.7 cm (67.99\")     Body mass index is 20.36 kg/m².     Mental Status Exam:   Hygiene:   good  Cooperation:  Cooperative  Eye Contact:  Good  Psychomotor Behavior:  Appropriate  Affect:  appropriate  Mood: sad  Speech:  Normal  Thought Process:  Goal directed  Thought Content:  Mood congruent  Suicidal:  None  Homicidal:  None  Hallucinations:  None  Delusion:  None  Memory:  Intact  Orientation:  Person, Place, Time, and Situation  Reliability:  good  Insight:  Good  Judgement:  Good  Impulse Control:  Good  Physical/Medical Issues:   Hashimoto's thyroiditis, celiac disease      Answers submitted by the patient for this visit:  Other (Submitted on 11/20/2023)  Please describe your symptoms.: Anxiety and depression  Have you had these symptoms before?: Yes  How long have you been having these symptoms?: Greater than 2 weeks  Please list any medications you are currently taking for this condition.: Lexapro (daily), propranolol and hydroxyzine (as needed for panic attack), trazadone (as needed for sleep)  Please describe any probable cause for these symptoms. : Daily stress, life changes.  Primary Reason for Visit (Submitted on 11/20/2023)  What is the primary reason for your visit?: Other      Assessment / Plan      Visit Diagnosis/Orders Placed This Visit:  Diagnoses and all orders for this visit:    1. Generalized anxiety disorder with panic " attacks  -     escitalopram (Lexapro) 10 MG tablet; Take 1.5 tablets by mouth Daily for 7 days, THEN 2 tablets Daily for 25 days.  Dispense: 61 tablet; Refill: 0  -     mirtazapine (REMERON) 7.5 MG tablet; Take 1 tablet by mouth Every Night.  Dispense: 30 tablet; Refill: 1    2. Moderate episode of recurrent major depressive disorder  -     escitalopram (Lexapro) 10 MG tablet; Take 1.5 tablets by mouth Daily for 7 days, THEN 2 tablets Daily for 25 days.  Dispense: 61 tablet; Refill: 0  -     mirtazapine (REMERON) 7.5 MG tablet; Take 1 tablet by mouth Every Night.  Dispense: 30 tablet; Refill: 1     -Continue Lexapro 10 mg - 2 tablets daily  - Start Remeron 7.5 mg daily  -Continue propranolol 10 mg-1-2 tablets by mouth daily as needed for anxiety/panic  -DC trazodone    **Patient has multiple food allergies including corn containing products, red dye, gluten which restricts her ability to use larger dose tablets of Lexapro     Plan:   Patient reports she has escalated her dose of Lexapro to 20 mg and take hydroxyzine for panic which made her drowsy.  Reports overall her anxiety is improved however she is going through a break-up so symptoms of depression are exacerbated.  She reports insomnia and anorexia.  Potential benefits, risk, side effects of switching trazodone for Remeron discussed.  Patient is agreeable.  I encouraged her to try the propranolol for anxiety instead of hydroxyzine as propranolol should not cause drowsiness.    Continue supportive psychotherapy efforts and medications as indicated. Treatment and medication options discussed during today's visit. Patient ackowledged and verbally consented to continue with current treatment plan and was educated on the importance of compliance with treatment and follow-up appointments. Patient seems reasonably able to adhere to treatment plan.      Medication Considerations:  Discussed medication options and treatment plan of prescribed medication(s) as well as  the risks, benefits, and potential side effects. Patient is agreeable to call the office with any worsening of symptoms or onset of side effects. Patient is agreeable to call 911 or go to the nearest ER should he/she begin having SI/HI.    Quality Measures:   Never smoker      Follow Up:   Return in about 4 weeks (around 12/25/2023).      NICOLAS Patrick, PMHNP-BC

## 2024-01-24 DIAGNOSIS — F41.0 GENERALIZED ANXIETY DISORDER WITH PANIC ATTACKS: ICD-10-CM

## 2024-01-24 DIAGNOSIS — F33.1 MODERATE EPISODE OF RECURRENT MAJOR DEPRESSIVE DISORDER: ICD-10-CM

## 2024-01-24 DIAGNOSIS — F41.1 GENERALIZED ANXIETY DISORDER WITH PANIC ATTACKS: ICD-10-CM

## 2024-01-25 ENCOUNTER — TELEPHONE (OUTPATIENT)
Age: 34
End: 2024-01-25
Payer: COMMERCIAL

## 2024-01-25 NOTE — TELEPHONE ENCOUNTER
I called Damari at [phone] and left her a voicemail, asking her to please call [facility] at [phone] to reschedule her follow-up appointment with NICOLAS Patrick in order for Ms. Betancourt to send in Rx refill(s) to her pharmacy.

## 2024-01-27 ENCOUNTER — OFFICE VISIT (OUTPATIENT)
Dept: FAMILY MEDICINE CLINIC | Facility: CLINIC | Age: 34
End: 2024-01-27
Payer: COMMERCIAL

## 2024-01-27 VITALS
OXYGEN SATURATION: 99 % | DIASTOLIC BLOOD PRESSURE: 72 MMHG | WEIGHT: 130.4 LBS | HEIGHT: 68 IN | BODY MASS INDEX: 19.76 KG/M2 | HEART RATE: 70 BPM | SYSTOLIC BLOOD PRESSURE: 110 MMHG

## 2024-01-27 DIAGNOSIS — E03.8 HYPOTHYROIDISM DUE TO HASHIMOTO'S THYROIDITIS: Chronic | ICD-10-CM

## 2024-01-27 DIAGNOSIS — F41.1 GENERALIZED ANXIETY DISORDER WITH PANIC ATTACKS: ICD-10-CM

## 2024-01-27 DIAGNOSIS — F41.0 GENERALIZED ANXIETY DISORDER WITH PANIC ATTACKS: ICD-10-CM

## 2024-01-27 DIAGNOSIS — E06.3 HYPOTHYROIDISM DUE TO HASHIMOTO'S THYROIDITIS: Chronic | ICD-10-CM

## 2024-01-27 DIAGNOSIS — G43.009 MIGRAINE WITHOUT AURA AND WITHOUT STATUS MIGRAINOSUS, NOT INTRACTABLE: Primary | ICD-10-CM

## 2024-01-27 DIAGNOSIS — F33.1 MODERATE EPISODE OF RECURRENT MAJOR DEPRESSIVE DISORDER: ICD-10-CM

## 2024-01-27 PROCEDURE — 99214 OFFICE O/P EST MOD 30 MIN: CPT | Performed by: STUDENT IN AN ORGANIZED HEALTH CARE EDUCATION/TRAINING PROGRAM

## 2024-01-27 RX ORDER — ONDANSETRON 4 MG/1
4 TABLET, ORALLY DISINTEGRATING ORAL EVERY 8 HOURS PRN
Qty: 30 TABLET | Refills: 0 | Status: SHIPPED | OUTPATIENT
Start: 2024-01-27

## 2024-01-27 RX ORDER — SUMATRIPTAN 50 MG/1
TABLET, FILM COATED ORAL
Qty: 12 TABLET | Refills: 0 | Status: SHIPPED | OUTPATIENT
Start: 2024-01-27

## 2024-01-27 RX ORDER — LEVOTHYROXINE SODIUM 75 UG/1
75 CAPSULE ORAL DAILY
Qty: 90 CAPSULE | Refills: 1 | Status: SHIPPED | OUTPATIENT
Start: 2024-01-27

## 2024-01-27 RX ORDER — ESCITALOPRAM OXALATE 10 MG/1
20 TABLET ORAL DAILY
Qty: 60 TABLET | Refills: 5 | Status: SHIPPED | OUTPATIENT
Start: 2024-01-27

## 2024-01-27 NOTE — PROGRESS NOTES
Answers submitted by the patient for this visit:  Other (Submitted on 2024)  Please describe your symptoms.: Check in for anxiety and depression.  Have you had these symptoms before?: Yes  How long have you been having these symptoms?: Greater than 2 weeks  Please list any medications you are currently taking for this condition.: Escitalopram, Mirtazapine  Primary Reason for Visit (Submitted on 2024)  What is the primary reason for your visit?: Other    Established Office Visit      Patient Name: Damari Muller  : 1990   MRN: [id number]   Care Team: Patient Care Team:  Ivelisse Burnett DO as PCP - General (Internal Medicine)  Efrain Siu PA-C as Physician Assistant (Endocrinology)  Tasia Betancourt APRN as Nurse Practitioner (Psychiatry)    Chief Complaint:    Chief Complaint   Patient presents with    Depression    Anxiety    Adenopathy     Left side, swollen, sister had strep     Migraine       History of Present Illness: Damari Muller is a 33 y.o. female with hypothyroidism, celiac disease, anxiety/depression who is here today to follow up with chronic medical conditions.     Since I have seen her last she has gone through a breakup and a move. She reports feeling better lately, October-January was a tough time. Panic attacks/anxiety have been improving. She has been following with behavioral health. Alternating between hydroxyzine and propranolol very rarely prn panic.     She started Mirtazapine 2023 but has been taking this inconsistently, only when she is feeling she needs sleep.    She is compliant with taking lexapro 20mg daily. Feels this has been helpful and is in a good place.     She has history of migraines. Typically well controlled but this week she has had 3. None today. Feels nausea when she has them, sometimes debilitating. Reports sleeping well, stress has been in a better place. She has been drinking significant amount of water.     She was exposed to  "strep earlier this week. She reports sore throat starting yesterday but it has improved today.     Subjective      Review of Systems:   Review of Systems - See HPI    I have reviewed and the following portions of the patient's history were updated as appropriate: past family history, past medical history, past social history, past surgical history and problem list.    Medications:     Current Outpatient Medications:     escitalopram (Lexapro) 10 MG tablet, Take 2 tablets by mouth Daily., Disp: 60 tablet, Rfl: 5    hydrOXYzine (ATARAX) 50 MG tablet, Take 1 tablet by mouth 3 (Three) Times a Day As Needed (panic)., Disp: 30 tablet, Rfl: 0    mirtazapine (REMERON) 7.5 MG tablet, Take 1 tablet by mouth Every Night., Disp: 30 tablet, Rfl: 1    propranolol (INDERAL) 10 MG tablet, TAKE 1 TO 2 TABLETS BY MOUTH TWICE A DAY AS NEEDED FOR ANXIETY/PANIC, Disp: 60 tablet, Rfl: 0    Tirosint 75 MCG capsule, Take 1 capsule by mouth Daily., Disp: 90 capsule, Rfl: 1    ondansetron ODT (ZOFRAN-ODT) 4 MG disintegrating tablet, Place 1 tablet on the tongue Every 8 (Eight) Hours As Needed for Nausea or Vomiting., Disp: 30 tablet, Rfl: 0    SUMAtriptan (Imitrex) 50 MG tablet, Take one tablet at onset of headache. May repeat dose one time in 2 hours if headache not relieved., Disp: 12 tablet, Rfl: 0    Allergies:   Allergies   Allergen Reactions    Corn-Containing Products GI Intolerance    Dairy Aid [Tilactase] GI Intolerance    Dye Fdc Red [Red Dye] GI Intolerance    Gluten Meal GI Intolerance    Soybean-Containing Drug Products GI Intolerance       Objective     Physical Exam:  Vital Signs:   Vitals:    01/27/24 0908   BP: 110/72   Pulse: 70   SpO2: 99%   Weight: 59.1 kg (130 lb 6.4 oz)   Height: 172.7 cm (67.99\")     Body mass index is 19.83 kg/m².     Physical Exam  Vitals reviewed.   Constitutional:       Appearance: Normal appearance.   HENT:      Right Ear: There is no impacted cerumen.      Left Ear: Tympanic membrane, ear canal " and external ear normal.      Mouth/Throat:      Pharynx: No oropharyngeal exudate or posterior oropharyngeal erythema.   Neck:      Comments: Mild left cervical LAD  Cardiovascular:      Rate and Rhythm: Normal rate.      Pulses: Normal pulses.   Pulmonary:      Effort: Pulmonary effort is normal. No respiratory distress.   Skin:     General: Skin is warm and dry.   Neurological:      Mental Status: She is alert.   Psychiatric:         Mood and Affect: Mood normal.         Behavior: Behavior normal.         Judgment: Judgment normal.         Assessment / Plan      Assessment/Plan:   Problems Addressed This Visit  Diagnoses and all orders for this visit:    1. Migraine without aura and without status migrainosus, not intractable (Primary)  -     SUMAtriptan (Imitrex) 50 MG tablet; Take one tablet at onset of headache. May repeat dose one time in 2 hours if headache not relieved.  Dispense: 12 tablet; Refill: 0  -     ondansetron ODT (ZOFRAN-ODT) 4 MG disintegrating tablet; Place 1 tablet on the tongue Every 8 (Eight) Hours As Needed for Nausea or Vomiting.  Dispense: 30 tablet; Refill: 0  Imitrex prn abortive therapy   Zofran prn nausea    2. Generalized anxiety disorder with panic attacks  -     escitalopram (Lexapro) 10 MG tablet; Take 2 tablets by mouth Daily.  Dispense: 60 tablet; Refill: 5  Well controlled with current lexapro 20mg daily   Continue following with behavioral health    3. Moderate episode of recurrent major depressive disorder  -     escitalopram (Lexapro) 10 MG tablet; Take 2 tablets by mouth Daily.  Dispense: 60 tablet; Refill: 5    4. Hypothyroidism due to Hashimoto's thyroiditis  -     Tirosint 75 MCG capsule; Take 1 capsule by mouth Daily.  Dispense: 90 capsule; Refill: 1  Due for TFTs at next visit       Plan of care reviewed with patient at the conclusion of today's visit. Education was provided regarding diagnosis and management.  Patient verbalizes understanding of and agreement with  management plan.    Follow Up:   Return in about 3 months (around 4/27/2024) for Annual.        DO REENE Washington [address]  [facility]  [address]  [facility] [address]  Fax [phone]  Phone [phone]

## 2024-01-30 RX ORDER — MIRTAZAPINE 7.5 MG/1
7.5 TABLET, FILM COATED ORAL NIGHTLY
Qty: 30 TABLET | Refills: 1 | OUTPATIENT
Start: 2024-01-30

## 2024-01-30 RX ORDER — ESCITALOPRAM OXALATE 10 MG/1
20 TABLET ORAL DAILY
Qty: 60 TABLET | Refills: 1 | OUTPATIENT
Start: 2024-01-30

## 2024-03-13 ENCOUNTER — OFFICE VISIT (OUTPATIENT)
Dept: FAMILY MEDICINE CLINIC | Facility: CLINIC | Age: 34
End: 2024-03-13
Payer: COMMERCIAL

## 2024-03-13 ENCOUNTER — PATIENT MESSAGE (OUTPATIENT)
Dept: FAMILY MEDICINE CLINIC | Facility: CLINIC | Age: 34
End: 2024-03-13

## 2024-03-13 ENCOUNTER — HOSPITAL ENCOUNTER (OUTPATIENT)
Dept: RADIOLOGY | Facility: CLINIC | Age: 34
Discharge: HOME | End: 2024-03-13
Payer: COMMERCIAL

## 2024-03-13 VITALS
OXYGEN SATURATION: 99 % | SYSTOLIC BLOOD PRESSURE: 110 MMHG | BODY MASS INDEX: 19.7 KG/M2 | HEIGHT: 68 IN | HEART RATE: 63 BPM | DIASTOLIC BLOOD PRESSURE: 70 MMHG | WEIGHT: 130 LBS

## 2024-03-13 DIAGNOSIS — S93.402A SPRAIN OF LEFT ANKLE, UNSPECIFIED LIGAMENT, INITIAL ENCOUNTER: ICD-10-CM

## 2024-03-13 DIAGNOSIS — S93.402A SPRAIN OF LEFT ANKLE, UNSPECIFIED LIGAMENT, INITIAL ENCOUNTER: Primary | ICD-10-CM

## 2024-03-13 RX ORDER — NAPROXEN 500 MG/1
500 TABLET ORAL 2 TIMES DAILY WITH MEALS
Qty: 14 TABLET | Refills: 0 | Status: SHIPPED | OUTPATIENT
Start: 2024-03-13

## 2024-03-13 NOTE — PROGRESS NOTES
Established Office Visit      Patient Name: Damari Muller  : 1990   MRN: [id number]   Care Team: Patient Care Team:  Ivelisse Burnett DO as PCP - General (Internal Medicine)  Efrain Siu PA-C as Physician Assistant (Endocrinology)  Tasia Betancourt APRN as Nurse Practitioner (Psychiatry)    Chief Complaint:    Chief Complaint   Patient presents with    Ankle Pain     Left side, bruising, x1 day        History of Present Illness: Damari Muller is a 33 y.o. female with hypothyroidism, celiac disease, anxiety/depression who is here today to discuss ankle pain.    Reports she was walking her dog yesterday when her ankle rolled off a ledge and heard audible pop. Since then she has experienced left lateral ankle pain, sharp. No swelling or bruising. Able to walk and bear weight although uncomfortable.     Subjective      Review of Systems:   Review of Systems - See HPI    I have reviewed and the following portions of the patient's history were updated as appropriate: past family history, past medical history, past social history, past surgical history and problem list.    Medications:     Current Outpatient Medications:     escitalopram (Lexapro) 10 MG tablet, Take 2 tablets by mouth Daily., Disp: 60 tablet, Rfl: 5    hydrOXYzine (ATARAX) 50 MG tablet, Take 1 tablet by mouth 3 (Three) Times a Day As Needed (panic)., Disp: 30 tablet, Rfl: 0    mirtazapine (REMERON) 7.5 MG tablet, Take 1 tablet by mouth Every Night., Disp: 30 tablet, Rfl: 1    ondansetron ODT (ZOFRAN-ODT) 4 MG disintegrating tablet, Place 1 tablet on the tongue Every 8 (Eight) Hours As Needed for Nausea or Vomiting., Disp: 30 tablet, Rfl: 0    propranolol (INDERAL) 10 MG tablet, TAKE 1 TO 2 TABLETS BY MOUTH TWICE A DAY AS NEEDED FOR ANXIETY/PANIC, Disp: 60 tablet, Rfl: 0    SUMAtriptan (Imitrex) 50 MG tablet, Take one tablet at onset of headache. May repeat dose one time in 2 hours if headache not relieved., Disp: 12  "tablet, Rfl: 0    Tirosint 75 MCG capsule, Take 1 capsule by mouth Daily., Disp: 90 capsule, Rfl: 1    naproxen (Naprosyn) 500 MG tablet, Take 1 tablet by mouth 2 (Two) Times a Day With Meals., Disp: 14 tablet, Rfl: 0    Allergies:   Allergies   Allergen Reactions    Corn-Containing Products GI Intolerance    Dairy Aid [Tilactase] GI Intolerance    Dye Fdc Red [Red Dye] GI Intolerance    Gluten Meal GI Intolerance    Soybean-Containing Drug Products GI Intolerance       Objective     Physical Exam:  Vital Signs:   Vitals:    03/13/24 1046   BP: 110/70   Pulse: 63   SpO2: 99%   Weight: 59 kg (130 lb)   Height: 172.7 cm (67.99\")   PainSc:   6     Body mass index is 19.77 kg/m².     Physical Exam  Vitals reviewed.   Constitutional:       Appearance: Normal appearance.   Cardiovascular:      Rate and Rhythm: Normal rate.   Pulmonary:      Effort: Pulmonary effort is normal. No respiratory distress.   Musculoskeletal:      Comments: Left ankle without deformity, swelling, or bruising.  Tenderness to palpation over lateral malleolus  ROM normal in flexion and eversion. Limited in inversion, extension.    Skin:     General: Skin is warm and dry.   Neurological:      Mental Status: She is alert.   Psychiatric:         Mood and Affect: Mood normal.         Behavior: Behavior normal.         Judgment: Judgment normal.         Assessment / Plan      Assessment/Plan:   Problems Addressed This Visit  Diagnoses and all orders for this visit:    1. Sprain of left ankle, unspecified ligament, initial encounter (Primary)  -     XR Ankle 3+ View Left; Future  -     naproxen (Naprosyn) 500 MG tablet; Take 1 tablet by mouth 2 (Two) Times a Day With Meals.  Dispense: 14 tablet; Refill: 0      Rule out bony abnormalities with XR given TTP over lateral malleolus   Otherwise suspect ankle sprain - discussed conservative therapy with RICE. Naproxen prn. Strongly emphasized importance of activity modification to allow for recovery. She has " upcoming work trip on Monday requiring extensive walking/standing. If symptoms are still bothersome, I do feel it would be in her best interested not to travel to avoid worsening strain. She will let me know if she needs work note.     Plan of care reviewed with patient at the conclusion of today's visit. Education was provided regarding diagnosis and management.  Patient verbalizes understanding of and agreement with management plan.    Follow Up:   No follow-ups on file.        DO RENEE [address]  [facility]  [address]  [facility] [phone]  Fax [phone]  Phone [phone]

## 2024-04-26 ENCOUNTER — OFFICE VISIT (OUTPATIENT)
Dept: FAMILY MEDICINE CLINIC | Facility: CLINIC | Age: 34
End: 2024-04-26
Payer: COMMERCIAL

## 2024-04-26 ENCOUNTER — LAB (OUTPATIENT)
Dept: LAB | Facility: HOSPITAL | Age: 34
End: 2024-04-26
Payer: COMMERCIAL

## 2024-04-26 VITALS
BODY MASS INDEX: 20.45 KG/M2 | HEART RATE: 52 BPM | DIASTOLIC BLOOD PRESSURE: 78 MMHG | SYSTOLIC BLOOD PRESSURE: 118 MMHG | HEIGHT: 68 IN | OXYGEN SATURATION: 98 % | WEIGHT: 134.9 LBS

## 2024-04-26 DIAGNOSIS — Z13.220 SCREENING FOR CHOLESTEROL LEVEL: ICD-10-CM

## 2024-04-26 DIAGNOSIS — Z80.3 FAMILY HISTORY OF BREAST CANCER IN MOTHER: ICD-10-CM

## 2024-04-26 DIAGNOSIS — G43.009 MIGRAINE WITHOUT AURA AND WITHOUT STATUS MIGRAINOSUS, NOT INTRACTABLE: ICD-10-CM

## 2024-04-26 DIAGNOSIS — E06.3 HYPOTHYROIDISM DUE TO HASHIMOTO'S THYROIDITIS: ICD-10-CM

## 2024-04-26 DIAGNOSIS — Z12.31 ENCOUNTER FOR SCREENING MAMMOGRAM FOR MALIGNANT NEOPLASM OF BREAST: ICD-10-CM

## 2024-04-26 DIAGNOSIS — E03.8 HYPOTHYROIDISM DUE TO HASHIMOTO'S THYROIDITIS: ICD-10-CM

## 2024-04-26 DIAGNOSIS — F41.1 GENERALIZED ANXIETY DISORDER WITH PANIC ATTACKS: ICD-10-CM

## 2024-04-26 DIAGNOSIS — K90.0 CELIAC DISEASE: ICD-10-CM

## 2024-04-26 DIAGNOSIS — F33.1 MODERATE EPISODE OF RECURRENT MAJOR DEPRESSIVE DISORDER: ICD-10-CM

## 2024-04-26 DIAGNOSIS — Z00.00 ANNUAL PHYSICAL EXAM: ICD-10-CM

## 2024-04-26 DIAGNOSIS — Z00.00 ANNUAL PHYSICAL EXAM: Primary | ICD-10-CM

## 2024-04-26 DIAGNOSIS — N64.4 BREAST PAIN, LEFT: ICD-10-CM

## 2024-04-26 DIAGNOSIS — F41.0 GENERALIZED ANXIETY DISORDER WITH PANIC ATTACKS: ICD-10-CM

## 2024-04-26 LAB
ALBUMIN SERPL-MCNC: 4.8 G/DL (ref 3.5–5.2)
ALBUMIN/GLOB SERPL: 1.8 G/DL
ALP SERPL-CCNC: 53 U/L (ref 39–117)
ALT SERPL W P-5'-P-CCNC: 21 U/L (ref 1–33)
ANION GAP SERPL CALCULATED.3IONS-SCNC: 8 MMOL/L (ref 5–15)
AST SERPL-CCNC: 25 U/L (ref 1–32)
BILIRUB SERPL-MCNC: 0.4 MG/DL (ref 0–1.2)
BUN SERPL-MCNC: 12 MG/DL (ref 6–20)
BUN/CREAT SERPL: 14.3 (ref 7–25)
CALCIUM SPEC-SCNC: 9.7 MG/DL (ref 8.6–10.5)
CHLORIDE SERPL-SCNC: 105 MMOL/L (ref 98–107)
CHOLEST SERPL-MCNC: 160 MG/DL (ref 0–200)
CO2 SERPL-SCNC: 26 MMOL/L (ref 22–29)
CREAT SERPL-MCNC: 0.84 MG/DL (ref 0.57–1)
DEPRECATED RDW RBC AUTO: 40.2 FL (ref 37–54)
EGFRCR SERPLBLD CKD-EPI 2021: 92.5 ML/MIN/1.73
ERYTHROCYTE [DISTWIDTH] IN BLOOD BY AUTOMATED COUNT: 11.8 % (ref 12.3–15.4)
GLOBULIN UR ELPH-MCNC: 2.7 GM/DL
GLUCOSE SERPL-MCNC: 89 MG/DL (ref 65–99)
HCT VFR BLD AUTO: 40.6 % (ref 34–46.6)
HDLC SERPL-MCNC: 94 MG/DL (ref 40–60)
HGB BLD-MCNC: 13.4 G/DL (ref 12–15.9)
LDLC SERPL CALC-MCNC: 57 MG/DL (ref 0–100)
LDLC/HDLC SERPL: 0.62 {RATIO}
MCH RBC QN AUTO: 30.8 PG (ref 26.6–33)
MCHC RBC AUTO-ENTMCNC: 33 G/DL (ref 31.5–35.7)
MCV RBC AUTO: 93.3 FL (ref 79–97)
PLATELET # BLD AUTO: 177 10*3/MM3 (ref 140–450)
PMV BLD AUTO: 11.5 FL (ref 6–12)
POTASSIUM SERPL-SCNC: 4.3 MMOL/L (ref 3.5–5.2)
PROT SERPL-MCNC: 7.5 G/DL (ref 6–8.5)
RBC # BLD AUTO: 4.35 10*6/MM3 (ref 3.77–5.28)
SODIUM SERPL-SCNC: 139 MMOL/L (ref 136–145)
T4 FREE SERPL-MCNC: 1.44 NG/DL (ref 0.93–1.7)
TRIGL SERPL-MCNC: 38 MG/DL (ref 0–150)
TSH SERPL DL<=0.05 MIU/L-ACNC: 2.95 UIU/ML (ref 0.27–4.2)
VLDLC SERPL-MCNC: 9 MG/DL (ref 5–40)
WBC NRBC COR # BLD AUTO: 4.7 10*3/MM3 (ref 3.4–10.8)

## 2024-04-26 PROCEDURE — 80050 GENERAL HEALTH PANEL: CPT

## 2024-04-26 PROCEDURE — 84439 ASSAY OF FREE THYROXINE: CPT

## 2024-04-26 PROCEDURE — 80061 LIPID PANEL: CPT

## 2024-04-26 RX ORDER — LEVOTHYROXINE SODIUM 75 UG/1
75 CAPSULE ORAL DAILY
Qty: 90 CAPSULE | Refills: 3 | Status: SHIPPED | OUTPATIENT
Start: 2024-04-26

## 2024-04-26 RX ORDER — ESCITALOPRAM OXALATE 10 MG/1
20 TABLET ORAL DAILY
Qty: 60 TABLET | Refills: 11 | Status: SHIPPED | OUTPATIENT
Start: 2024-04-26

## 2024-04-26 NOTE — PROGRESS NOTES
Physical Exam     Patient Name: Damari Mulelr  : 1990   MRN: [id number]   Care Team: Patient Care Team:  Ivelisse Burnett DO as PCP - General (Internal Medicine)  Efrain Siu PA-C as Physician Assistant (Endocrinology)  Tasia Betancourt APRN as Nurse Practitioner (Psychiatry)    Chief Complaint:    Chief Complaint   Patient presents with    Annual Exam       History of Present Illness: Damari Muller is a 34 y.o. female with hypothyroidism, celiac disease, anxiety/depression who is presents today for annual healthcare maintenance.     Feeling well today  No acute complaints today other than left sided breast pain which has been bothering her for a few weeks/months now. No palpable masses. No skin changes. No nipple changes.       PHQ-2 Depression Screening  Little interest or pleasure in doing things? 0-->not at all   Feeling down, depressed, or hopeless? 0-->not at all   PHQ-2 Total Score 0         Health Maintenance   Topic Date Due    COVID-19 Vaccine (3 - -24 season) 2024 (Originally 2023)    TDAP/TD VACCINES (1 - Tdap) 10/01/2025 (Originally 2007)    INFLUENZA VACCINE  2024    ANNUAL PHYSICAL  2025    PAP SMEAR  2026    HEPATITIS C SCREENING  Completed    Pneumococcal Vaccine 0-64  Aged Out       Subjective      Review of Systems:   Review of Systems - See HPI    Past Medical History:   Past Medical History:   Diagnosis Date    Allergic     Celiac    Anemia     Anxiety     Celiac disease     GERD (gastroesophageal reflux disease)     History of medical problems     Diagnosed with adjustment disorder    Hypothyroidism     Migraine     Scoliosis 2004    Thyroid disease        Past Surgical History:   Past Surgical History:   Procedure Laterality Date    COLONOSCOPY  2016    LEEP      WISDOM TOOTH EXTRACTION         Family History:   Family History   Problem Relation Age of Onset    Migraines Mother     Breast cancer Mother 30     Cancer Mother     ADD / ADHD Sister     Cancer Maternal Grandfather     Cancer Maternal Grandmother     Migraines Maternal Grandmother     Thyroid disease Maternal Grandmother     Ovarian cancer Maternal Grandmother 80       Social History:   Social History     Socioeconomic History    Marital status: Single   Tobacco Use    Smoking status: Never     Passive exposure: Past    Smokeless tobacco: Never   Vaping Use    Vaping status: Never Used   Substance and Sexual Activity    Alcohol use: Yes     Comment: Occasional social drinking    Drug use: Never    Sexual activity: Yes     Birth control/protection: Other, None       Tobacco History:   Social History     Tobacco Use   Smoking Status Never    Passive exposure: Past   Smokeless Tobacco Never       Medications:     Current Outpatient Medications:     escitalopram (Lexapro) 10 MG tablet, Take 2 tablets by mouth Daily., Disp: 60 tablet, Rfl: 11    hydrOXYzine (ATARAX) 50 MG tablet, Take 1 tablet by mouth 3 (Three) Times a Day As Needed (panic)., Disp: 30 tablet, Rfl: 0    ondansetron ODT (ZOFRAN-ODT) 4 MG disintegrating tablet, Place 1 tablet on the tongue Every 8 (Eight) Hours As Needed for Nausea or Vomiting., Disp: 30 tablet, Rfl: 0    propranolol (INDERAL) 10 MG tablet, TAKE 1 TO 2 TABLETS BY MOUTH TWICE A DAY AS NEEDED FOR ANXIETY/PANIC, Disp: 60 tablet, Rfl: 0    SUMAtriptan (Imitrex) 50 MG tablet, Take one tablet at onset of headache. May repeat dose one time in 2 hours if headache not relieved., Disp: 12 tablet, Rfl: 0    Tirosint 75 MCG capsule, Take 1 capsule by mouth Daily., Disp: 90 capsule, Rfl: 3    Allergies:   Allergies   Allergen Reactions    Corn-Containing Products GI Intolerance    Dairy Aid [Tilactase] GI Intolerance    Dye Fdc Red [Red Dye] GI Intolerance    Gluten Meal GI Intolerance    Soybean-Containing Drug Products GI Intolerance       Past Medical History, Social History, Family History and Care Team were all reviewed with patient and  "updated as appropriate.       Objective     Physical Exam:  Vital Signs:   Vitals:    04/26/24 0908   BP: 118/78   Pulse: 52   SpO2: 98%   Weight: 61.2 kg (134 lb 14.4 oz)   Height: 172.7 cm (67.99\")     Body mass index is 20.52 kg/m².     Physical Exam  Vitals reviewed.   Constitutional:       Appearance: Normal appearance. She is not ill-appearing.   Cardiovascular:      Rate and Rhythm: Normal rate and regular rhythm.      Heart sounds: Normal heart sounds.   Pulmonary:      Effort: Pulmonary effort is normal. No respiratory distress.      Breath sounds: Normal breath sounds. No wheezing.   Chest:          Comments: Tenderness at 2 oclock left breast. No palpable masses or overlying skin changes.   Skin:     General: Skin is warm and dry.   Neurological:      Mental Status: She is alert.   Psychiatric:         Mood and Affect: Mood normal.         Behavior: Behavior normal.         Judgment: Judgment normal.         Assessment / Plan      Assessment/Plan:   Problems Addressed This Visit  Diagnoses and all orders for this visit:    1. Annual physical exam (Primary)  -     CBC (No Diff); Future  -     Lipid Panel; Future  -     Comprehensive Metabolic Panel; Future  -     TSH; Future    2. Family history of breast cancer in mother  -     Mammo Diagnostic Digital Tomosynthesis Bilateral With CAD; Future    3. Encounter for screening mammogram for malignant neoplasm of breast  -     Mammo Diagnostic Digital Tomosynthesis Bilateral With CAD; Future    4. Generalized anxiety disorder with panic attacks  -     escitalopram (Lexapro) 10 MG tablet; Take 2 tablets by mouth Daily.  Dispense: 60 tablet; Refill: 11    5. Moderate episode of recurrent major depressive disorder  -     escitalopram (Lexapro) 10 MG tablet; Take 2 tablets by mouth Daily.  Dispense: 60 tablet; Refill: 11    6. Hypothyroidism due to Hashimoto's thyroiditis  -     Tirosint 75 MCG capsule; Take 1 capsule by mouth Daily.  Dispense: 90 capsule; Refill: " 3  -     TSH; Future  -     T4, free; Future    7. Celiac disease    8. Migraine without aura and without status migrainosus, not intractable    9. Screening for cholesterol level  -     Lipid Panel; Future    10. Breast pain, left  -     Mammo Diagnostic Digital Tomosynthesis Bilateral With CAD; Future        Healthcare Maintenance   Vaccines:  -COVID19 politely declined    Cancer Screening  -Mammogram 2/2023 due to mothers family history of breast cancer in 30s (BRCA neg) - repeat due annually, ordered today. Diagnostic due to breast pain.   -Colonoscopy not indicated  -Pap smear 4/2023 ASCUS with +HPV - she was referred to GYN at that time. She is following with [facility] and records have been requested. LEEP in 6/2023.     Other  -PHQ score 0  -Counseled on importance of maintaining healthy diet and routine exercise. Encouraged 150 min exercise per week.  -Tobacco cessation: not indicated, nonsmoker  -Contraception: none, does not desire.   -Blood pressure is at goal <130/80  -Metabolic screening today    Encouraged routine eye and dental exams.     Plan of care reviewed with patient at the conclusion of today's visit. Education was provided regarding diagnosis and management.  Patient verbalizes understanding of and agreement with management plan.    Follow Up:   Return in about 1 year (around 4/26/2025) for Annual.        DO RENEE Washington RD  [facility]  [address]  [facility] [address]  Fax [phone]  Phone [phone]

## 2024-04-29 ENCOUNTER — PATIENT MESSAGE (OUTPATIENT)
Dept: FAMILY MEDICINE CLINIC | Facility: CLINIC | Age: 34
End: 2024-04-29
Payer: COMMERCIAL

## 2024-04-29 DIAGNOSIS — S93.402A SPRAIN OF LEFT ANKLE, UNSPECIFIED LIGAMENT, INITIAL ENCOUNTER: Primary | ICD-10-CM

## 2024-05-10 ENCOUNTER — OFFICE VISIT (OUTPATIENT)
Dept: ORTHOPEDIC SURGERY | Facility: CLINIC | Age: 34
End: 2024-05-10
Payer: COMMERCIAL

## 2024-05-10 VITALS
BODY MASS INDEX: 19.55 KG/M2 | HEIGHT: 68 IN | DIASTOLIC BLOOD PRESSURE: 74 MMHG | SYSTOLIC BLOOD PRESSURE: 116 MMHG | WEIGHT: 129 LBS

## 2024-05-10 DIAGNOSIS — M76.72 PERONEAL TENDINITIS OF LEFT LOWER EXTREMITY: Primary | ICD-10-CM

## 2024-05-10 DIAGNOSIS — M79.672 LEFT FOOT PAIN: ICD-10-CM

## 2024-05-10 PROCEDURE — 99213 OFFICE O/P EST LOW 20 MIN: CPT

## 2024-05-10 NOTE — PROGRESS NOTES
[facility] Office Visit - Janina Barrera PA-C    Office Visit       Patient Name: Damari Muller    Chief Complaint:   Chief Complaint   Patient presents with    Left Foot - Pain       Referring Physician: No ref. provider found    History of Present Illness:   Damari Muller is a 34 y.o. female who presents with left foot pain.  Ongoing for the last several months.  Described as aching, throbbing, stabbing, shooting pain. Localized to the lateral side of foot. Initially started last year with activity such as walking and hiking. No specific injury or trauma. She enjoys staying active and working out. Pain persisted with activity.    She then sprained her ankle in March 2024 which resulted in increased pain to the ankle as well.  This pain has now dissipated.  The lateral foot pain has remained.      Subjective     Review of Systems     Past Medical History:   Past Medical History:   Diagnosis Date    Allergic 2010    Celiac    Anemia     Ankle sprain 03/13/2024    Xray did not reveal break/fracture    Anxiety     Celiac disease     GERD (gastroesophageal reflux disease) 2016    History of medical problems 2023    Diagnosed with adjustment disorder    Hypothyroidism     Migraine     Scoliosis 2004    Thyroid disease        Past Surgical History:   Past Surgical History:   Procedure Laterality Date    COLONOSCOPY  2016    LEEP      WISDOM TOOTH EXTRACTION         Family History:   Family History   Problem Relation Age of Onset    Migraines Mother     Breast cancer Mother 30    Cancer Mother     ADD / ADHD Sister     Cancer Maternal Grandfather     Cancer Maternal Grandmother     Migraines Maternal Grandmother     Thyroid disease Maternal Grandmother     Ovarian cancer Maternal Grandmother 80       Social History:   Social History     Socioeconomic History    Marital status: Single   Tobacco Use    Smoking status: Never      Passive exposure: Past    Smokeless tobacco: Never   Vaping Use    Vaping status: Never Used    Passive vaping exposure: Yes   Substance and Sexual Activity    Alcohol use: Not Currently     Comment: Occasional social drinking    Drug use: Never    Sexual activity: Yes     Birth control/protection: Other, None       Medications:   Current Outpatient Medications:     escitalopram (Lexapro) 10 MG tablet, Take 2 tablets by mouth Daily., Disp: 60 tablet, Rfl: 11    hydrOXYzine (ATARAX) 50 MG tablet, Take 1 tablet by mouth 3 (Three) Times a Day As Needed (panic)., Disp: 30 tablet, Rfl: 0    ondansetron ODT (ZOFRAN-ODT) 4 MG disintegrating tablet, Place 1 tablet on the tongue Every 8 (Eight) Hours As Needed for Nausea or Vomiting., Disp: 30 tablet, Rfl: 0    propranolol (INDERAL) 10 MG tablet, TAKE 1 TO 2 TABLETS BY MOUTH TWICE A DAY AS NEEDED FOR ANXIETY/PANIC, Disp: 60 tablet, Rfl: 0    SUMAtriptan (Imitrex) 50 MG tablet, Take one tablet at onset of headache. May repeat dose one time in 2 hours if headache not relieved., Disp: 12 tablet, Rfl: 0    Tirosint 75 MCG capsule, Take 1 capsule by mouth Daily., Disp: 90 capsule, Rfl: 3    Diclofenac Sodium (VOLTAREN) 1 % gel gel, Apply 4 g topically to the appropriate area as directed 3 (Three) Times a Day As Needed (Use as needed to affected area) for up to 30 days., Disp: 100 g, Rfl: 2    Allergies:   Allergies   Allergen Reactions    Corn-Containing Products GI Intolerance    Dairy Aid [Tilactase] GI Intolerance    Dye Fdc Red [Red Dye] GI Intolerance    Gluten Meal GI Intolerance    Soybean-Containing Drug Products GI Intolerance       I have reviewed and updated the following portions of the patient's history and review of systems: allergies, current medications, past family history, past medical history, past social history, past surgical history and problem list.    Objective      Vital Signs:   Vitals:    05/10/24 0953   BP: 116/74   Weight: 58.5 kg (129 lb)   Height:  "172.1 cm (67.75\")       Ortho Exam:  Peripheral Vascular:  Lower Extremity:  Inspection:  Left--rapid capillary refill  Palpation:  Dorsalis pedis pulse:  Left--normal    Neurologic  Sensory:    Light Touch:     Left foot:  Dorsal intact and plantar intact    Overall Assessment of Muscle Strength and Tone:  Lower Extremities:       Left:  Tibialis anterior--5/5    Gastroc soleus--5/5    EHL--5/5    FHL--5/5    Musculoskeletal  Lower Extremity  Ankle/Foot:  Inspection and Palpation:        Left:  Tenderness:Peroneal tendon along lateral portion of foot    Swelling:None    Effusion:  None    Crepitus:  None     ROM:     Left: Plantarflexion--50    Dorsiflexion--20    Inversion--10    Eversion--10    Instability:          Left: Anterior drawer test--negative    Squeeze test--negative      Results Review:   XR Foot 3+ View Left  Left Foot X-Ray 05/10/24   Indication: Pain  Views: 3 weight bearing , comparison to previous  Findings: xrays reviewed by me today in the office and show, No fracture,   No bony lesion, Normal soft tissues, Normal joint spaces, slight pes   planus           Assessment / Plan      Assessment:  Diagnoses and all orders for this visit:    1. Peroneal tendinitis of left lower extremity (Primary)  -     Diclofenac Sodium (VOLTAREN) 1 % gel gel; Apply 4 g topically to the appropriate area as directed 3 (Three) Times a Day As Needed (Use as needed to affected area) for up to 30 days.  Dispense: 100 g; Refill: 2    2. Left foot pain  -     XR Foot 3+ View Left  -     Diclofenac Sodium (VOLTAREN) 1 % gel gel; Apply 4 g topically to the appropriate area as directed 3 (Three) Times a Day As Needed (Use as needed to affected area) for up to 30 days.  Dispense: 100 g; Refill: 2        Quality Metrics:   BMI:   BMI is within normal parameters. No other follow-up for BMI required.       Tobacco:   Damari HULL Yohana  reports that she has never smoked. She has been exposed to tobacco smoke. She has never used " smokeless tobacco.     Plan:  X-ray images left foot personally reviewed interpreted today with Dr. Wakefield.  No evidence of bony abnormality.  I have also reviewed x-rays of left ankle from 3/13/2024.  Again no acute bony changes.  Lateral foot pain secondary to peroneal tendinitis.  Counseled patient regarding overuse nature of this type of injury.  Encouraged short period of immobilization to allow for healing of the peroneal tendons.  Recommend use of tall walking boot for the next 4 weeks.  She will avoid excessive activity until pain has calmed down.  I have also recommended use of an anti-inflammatory.  Prescription sent for Voltaren gel to use topically.  We discussed possible physical therapy in the future after period of immobilization.  I will see her back in 2 to 3 months if pain has persisted.    History, diagnosis and treatment plan discussed with Dr. Wakefield.      Follow Up:   2-3 months if pain persists        Janina Barrera PA-C  [facility]       Dictated using Dragon Speech Recognition.

## 2024-05-21 ENCOUNTER — HOSPITAL ENCOUNTER (OUTPATIENT)
Facility: HOSPITAL | Age: 34
Discharge: HOME | End: 2024-05-21
Payer: COMMERCIAL

## 2024-05-21 DIAGNOSIS — Z80.3 FAMILY HISTORY OF BREAST CANCER IN MOTHER: ICD-10-CM

## 2024-05-21 DIAGNOSIS — R92.30 DENSE BREAST: Primary | ICD-10-CM

## 2024-05-21 DIAGNOSIS — N64.4 BREAST PAIN, LEFT: ICD-10-CM

## 2024-05-21 DIAGNOSIS — Z12.31 ENCOUNTER FOR SCREENING MAMMOGRAM FOR MALIGNANT NEOPLASM OF BREAST: ICD-10-CM

## 2024-05-21 PROCEDURE — 77062 BREAST TOMOSYNTHESIS BI: CPT | Performed by: RADIOLOGY

## 2024-05-21 PROCEDURE — 76642 ULTRASOUND BREAST LIMITED: CPT | Performed by: RADIOLOGY

## 2024-05-21 PROCEDURE — 76642 ULTRASOUND BREAST LIMITED: CPT

## 2024-05-21 PROCEDURE — 77066 DX MAMMO INCL CAD BI: CPT

## 2024-05-21 PROCEDURE — G0279 TOMOSYNTHESIS, MAMMO: HCPCS

## 2024-05-21 PROCEDURE — 77066 DX MAMMO INCL CAD BI: CPT | Performed by: RADIOLOGY

## 2024-06-28 ENCOUNTER — OFFICE VISIT (OUTPATIENT)
Dept: FAMILY MEDICINE CLINIC | Facility: CLINIC | Age: 34
End: 2024-06-28
Payer: COMMERCIAL

## 2024-06-28 VITALS
HEIGHT: 68 IN | WEIGHT: 134 LBS | SYSTOLIC BLOOD PRESSURE: 110 MMHG | BODY MASS INDEX: 20.31 KG/M2 | DIASTOLIC BLOOD PRESSURE: 70 MMHG | OXYGEN SATURATION: 96 % | HEART RATE: 75 BPM

## 2024-06-28 DIAGNOSIS — L30.4 INTERTRIGO: Primary | ICD-10-CM

## 2024-06-28 PROCEDURE — 99213 OFFICE O/P EST LOW 20 MIN: CPT | Performed by: STUDENT IN AN ORGANIZED HEALTH CARE EDUCATION/TRAINING PROGRAM

## 2024-06-28 RX ORDER — CLOTRIMAZOLE AND BETAMETHASONE DIPROPIONATE 10; .64 MG/G; MG/G
1 CREAM TOPICAL 2 TIMES DAILY
Qty: 45 G | Refills: 0 | Status: SHIPPED | OUTPATIENT
Start: 2024-06-28

## 2024-06-28 NOTE — PROGRESS NOTES
Established Office Visit      Patient Name: Damari Muller  : 1990   MRN: [id number]   Care Team: Patient Care Team:  Ivelisse Burnett DO as PCP - General (Internal Medicine)  Efrain Siu PA-C as Physician Assistant (Endocrinology)  Tasia Betancourt APRN as Nurse Practitioner (Psychiatry)    Chief Complaint:    Chief Complaint   Patient presents with    Rash     On armpit right side, x2 months, redness, painful       History of Present Illness: Damari Muller is a 34 y.o. female who is here today to discuss rash.     She has rash under her right armpit which has been coming and going over the past 2 months without improvement. She has not been using deodorant or changed any type of clothing/detergent/cleansers. It is extremely itchy and uncomfortable.     Subjective      Review of Systems:   Review of Systems - See HPI    I have reviewed and the following portions of the patient's history were updated as appropriate: past family history, past medical history, past social history, past surgical history and problem list.    Medications:     Current Outpatient Medications:     escitalopram (Lexapro) 10 MG tablet, Take 2 tablets by mouth Daily., Disp: 60 tablet, Rfl: 11    hydrOXYzine (ATARAX) 50 MG tablet, Take 1 tablet by mouth 3 (Three) Times a Day As Needed (panic)., Disp: 30 tablet, Rfl: 0    ondansetron ODT (ZOFRAN-ODT) 4 MG disintegrating tablet, Place 1 tablet on the tongue Every 8 (Eight) Hours As Needed for Nausea or Vomiting., Disp: 30 tablet, Rfl: 0    propranolol (INDERAL) 10 MG tablet, TAKE 1 TO 2 TABLETS BY MOUTH TWICE A DAY AS NEEDED FOR ANXIETY/PANIC, Disp: 60 tablet, Rfl: 0    SUMAtriptan (Imitrex) 50 MG tablet, Take one tablet at onset of headache. May repeat dose one time in 2 hours if headache not relieved., Disp: 12 tablet, Rfl: 0    Tirosint 75 MCG capsule, Take 1 capsule by mouth Daily., Disp: 90 capsule, Rfl: 3    clotrimazole-betamethasone (LOTRISONE) 1-0.05 %  "cream, Apply 1 Application topically to the appropriate area as directed 2 (Two) Times a Day. Use for 7-14 days. Stop if no improvement by day 7., Disp: 45 g, Rfl: 0    Allergies:   Allergies   Allergen Reactions    Corn-Containing Products GI Intolerance    Dairy Aid [Tilactase] GI Intolerance    Dye Fdc Red [Red Dye] GI Intolerance    Gluten Meal GI Intolerance    Soybean-Containing Drug Products GI Intolerance       Objective     Physical Exam:  Vital Signs:   Vitals:    06/28/24 0843   BP: 110/70   Pulse: 75   SpO2: 96%   Weight: 60.8 kg (134 lb)   Height: 172.1 cm (67.75\")     Body mass index is 20.53 kg/m².     Physical Exam  Vitals reviewed.   Constitutional:       Appearance: Normal appearance.   Cardiovascular:      Rate and Rhythm: Normal rate.   Pulmonary:      Effort: Pulmonary effort is normal. No respiratory distress.   Skin:     General: Skin is warm and dry.      Comments: Right axilla with erythematous rash, few superficial fissures    Neurological:      Mental Status: She is alert.   Psychiatric:         Mood and Affect: Mood normal.         Behavior: Behavior normal.         Judgment: Judgment normal.         Assessment / Plan      Assessment/Plan:   Problems Addressed This Visit  Diagnoses and all orders for this visit:    1. Intertrigo (Primary)  -     clotrimazole-betamethasone (LOTRISONE) 1-0.05 % cream; Apply 1 Application topically to the appropriate area as directed 2 (Two) Times a Day. Use for 7-14 days. Stop if no improvement by day 7.  Dispense: 45 g; Refill: 0          Plan of care reviewed with patient at the conclusion of today's visit. Education was provided regarding diagnosis and management.  Patient verbalizes understanding of and agreement with management plan.    Follow Up:   No follow-ups on file.        DO RENEE Washington RD  [facility]  [address]  [facility] [address]  Fax [phone]  Phone " [phone]

## 2024-07-12 ENCOUNTER — OFFICE VISIT (OUTPATIENT)
Dept: ORTHOPEDIC SURGERY | Facility: CLINIC | Age: 34
End: 2024-07-12
Payer: COMMERCIAL

## 2024-07-12 VITALS
BODY MASS INDEX: 20.33 KG/M2 | WEIGHT: 134.15 LBS | DIASTOLIC BLOOD PRESSURE: 62 MMHG | SYSTOLIC BLOOD PRESSURE: 98 MMHG | HEIGHT: 68 IN

## 2024-07-12 DIAGNOSIS — M76.72 PERONEAL TENDINITIS OF LEFT LOWER EXTREMITY: Primary | ICD-10-CM

## 2024-07-12 NOTE — PROGRESS NOTES
[facility] Office Follow Up       Office Follow Up Visit       Patient Name: Damari Muller    Chief Complaint:   Chief Complaint   Patient presents with    Follow-up     2 month f/u; Peroneal tendinitis of left lower extremity        Referring Physician: No ref. provider found    History of Present Illness:   Damari Muller returns to clinic today for follow-up of left peroneal tendinitis.  Last visit on 5/10/2024.  Provided tall walking boot at that time and encouraged to wear for 4 weeks.  She says the pain improved with boot use.  Pain has returned at times but not as severe.  Worsens when walking or up on her tiptoes. Overall better.    5/10/24  Ongoing for the last several months.  Described as aching, throbbing, stabbing, shooting pain. Localized to the lateral side of foot. Initially started last year with activity such as walking and hiking. No specific injury or trauma. She enjoys staying active and working out. Pain persisted with activity.    Subjective     Review of Systems   Constitutional:  Negative for chills, fever, unexpected weight gain and unexpected weight loss.   HENT:  Negative for congestion, postnasal drip and rhinorrhea.    Eyes:  Negative for blurred vision.   Respiratory:  Negative for shortness of breath.    Cardiovascular:  Negative for leg swelling.   Gastrointestinal:  Negative for abdominal pain, nausea and vomiting.   Genitourinary:  Negative for difficulty urinating.   Musculoskeletal:  Positive for arthralgias. Negative for gait problem, joint swelling and myalgias.   Skin:  Negative for skin lesions and wound.   Neurological:  Negative for dizziness, weakness, light-headedness and numbness.   Hematological:  Does not bruise/bleed easily.   Psychiatric/Behavioral:  Negative for depressed mood.         I have reviewed and updated the following portions of the patient's history and review of  "systems: allergies, current medications, past family history, past medical history, past social history, past surgical history and problem list.    Medications:   Current Outpatient Medications:     clotrimazole-betamethasone (LOTRISONE) 1-0.05 % cream, Apply 1 Application topically to the appropriate area as directed 2 (Two) Times a Day. Use for 7-14 days. Stop if no improvement by day 7., Disp: 45 g, Rfl: 0    escitalopram (Lexapro) 10 MG tablet, Take 2 tablets by mouth Daily., Disp: 60 tablet, Rfl: 11    hydrOXYzine (ATARAX) 50 MG tablet, Take 1 tablet by mouth 3 (Three) Times a Day As Needed (panic)., Disp: 30 tablet, Rfl: 0    ondansetron ODT (ZOFRAN-ODT) 4 MG disintegrating tablet, Place 1 tablet on the tongue Every 8 (Eight) Hours As Needed for Nausea or Vomiting., Disp: 30 tablet, Rfl: 0    propranolol (INDERAL) 10 MG tablet, TAKE 1 TO 2 TABLETS BY MOUTH TWICE A DAY AS NEEDED FOR ANXIETY/PANIC, Disp: 60 tablet, Rfl: 0    SUMAtriptan (Imitrex) 50 MG tablet, Take one tablet at onset of headache. May repeat dose one time in 2 hours if headache not relieved., Disp: 12 tablet, Rfl: 0    Tirosint 75 MCG capsule, Take 1 capsule by mouth Daily., Disp: 90 capsule, Rfl: 3    Allergies:   Allergies   Allergen Reactions    Corn-Containing Products GI Intolerance    Dairy Aid [Tilactase] GI Intolerance    Dye Fdc Red [Red Dye] GI Intolerance    Gluten Meal GI Intolerance    Soybean-Containing Drug Products GI Intolerance         Objective      Vital Signs:   Vitals:    07/12/24 0803   BP: 98/62   Weight: 60.9 kg (134 lb 2.4 oz)   Height: 172.1 cm (67.76\")       Ortho Exam:  General: no acute distress, comfortable  Vitals reviewed in chart    Musculoskeletal Exam:    SIDE: Left lower extremity    Tenderness: Peroneal tendon, lateral side    Normal gait  Pain with forced plantarflexion  No evidence of septic joint      Results Review:  None      Assessment / Plan      Assessment:   Diagnoses and all orders for this " visit:    1. Peroneal tendinitis of left lower extremity (Primary)        Quality Metrics:   BMI:   BMI is within normal parameters. No other follow-up for BMI required.       Tobacco:   Damari Muller  reports that she has never smoked. She has been exposed to tobacco smoke. She has never used smokeless tobacco.           Plan:  Left peroneal tendinitis improved with tall walking boot use and home exercises for the last 2 months.  Pain returns occasionally with overuse but she has been able to manage with periodic rest and immobilization. I explained these injuries often take 3 months or longer of treatment to resolve. She will continue to monitor and rest when appropriate.  Continue with home exercises.  May take OTC anti-inflammatories as needed.  She will keep me updated and return as needed.  Recommend follow-up with Dr. Wakefield if symptoms persist.      Follow Up:   Return as needed.    Janina Barrera PA-C  [facility]    Dictated using Dragon Speech Recognition.

## 2024-07-24 ENCOUNTER — HOSPITAL ENCOUNTER (OUTPATIENT)
Dept: MRI IMAGING | Facility: HOSPITAL | Age: 34
Discharge: HOME | End: 2024-07-24
Admitting: INTERNAL MEDICINE
Payer: COMMERCIAL

## 2024-07-24 DIAGNOSIS — R92.30 DENSE BREAST: ICD-10-CM

## 2024-07-24 PROCEDURE — 0 GADOBENATE DIMEGLUMINE 529 MG/ML SOLUTION: Performed by: INTERNAL MEDICINE

## 2024-07-24 PROCEDURE — 77049 MRI BREAST C-+ W/CAD BI: CPT

## 2024-07-24 PROCEDURE — A9577 INJ MULTIHANCE: HCPCS | Performed by: INTERNAL MEDICINE

## 2024-07-24 RX ADMIN — GADOBENATE DIMEGLUMINE 12 ML: 529 INJECTION, SOLUTION INTRAVENOUS at 08:39

## 2024-07-30 ENCOUNTER — PATIENT MESSAGE (OUTPATIENT)
Dept: FAMILY MEDICINE CLINIC | Facility: CLINIC | Age: 34
End: 2024-07-30
Payer: COMMERCIAL

## 2024-07-30 ENCOUNTER — TELEPHONE (OUTPATIENT)
Dept: MRI IMAGING | Facility: HOSPITAL | Age: 34
End: 2024-07-30
Payer: COMMERCIAL

## 2024-07-30 DIAGNOSIS — R16.0 LIVER MASS: Primary | ICD-10-CM

## 2024-07-30 NOTE — TELEPHONE ENCOUNTER
Patient was recommended from her [facility] for a bilateral 2nd look ultrasound. Scheduled for 8/23/2024 at 9:00 with 8:30 arrival at [address]. No BT. Encouraged to call with any further questions.

## 2024-08-05 ENCOUNTER — OFFICE VISIT (OUTPATIENT)
Dept: FAMILY MEDICINE CLINIC | Facility: CLINIC | Age: 34
End: 2024-08-05
Payer: COMMERCIAL

## 2024-08-05 ENCOUNTER — LAB (OUTPATIENT)
Dept: LAB | Facility: HOSPITAL | Age: 34
End: 2024-08-05
Payer: COMMERCIAL

## 2024-08-05 VITALS
DIASTOLIC BLOOD PRESSURE: 72 MMHG | HEIGHT: 68 IN | WEIGHT: 130.6 LBS | OXYGEN SATURATION: 98 % | SYSTOLIC BLOOD PRESSURE: 110 MMHG | BODY MASS INDEX: 19.79 KG/M2 | HEART RATE: 89 BPM

## 2024-08-05 DIAGNOSIS — F33.1 MODERATE EPISODE OF RECURRENT MAJOR DEPRESSIVE DISORDER: ICD-10-CM

## 2024-08-05 DIAGNOSIS — R00.2 PALPITATIONS: Primary | ICD-10-CM

## 2024-08-05 DIAGNOSIS — R53.82 CHRONIC FATIGUE: ICD-10-CM

## 2024-08-05 DIAGNOSIS — R06.02 SHORTNESS OF BREATH: ICD-10-CM

## 2024-08-05 DIAGNOSIS — F41.0 GENERALIZED ANXIETY DISORDER WITH PANIC ATTACKS: ICD-10-CM

## 2024-08-05 DIAGNOSIS — F41.1 GENERALIZED ANXIETY DISORDER WITH PANIC ATTACKS: ICD-10-CM

## 2024-08-05 PROCEDURE — 82306 VITAMIN D 25 HYDROXY: CPT

## 2024-08-05 PROCEDURE — 82607 VITAMIN B-12: CPT

## 2024-08-05 PROCEDURE — 99214 OFFICE O/P EST MOD 30 MIN: CPT | Performed by: STUDENT IN AN ORGANIZED HEALTH CARE EDUCATION/TRAINING PROGRAM

## 2024-08-05 RX ORDER — BUPROPION HYDROCHLORIDE 150 MG/1
150 TABLET ORAL DAILY
Qty: 30 TABLET | Refills: 2 | Status: SHIPPED | OUTPATIENT
Start: 2024-08-05

## 2024-08-05 RX ORDER — PROPRANOLOL HYDROCHLORIDE 10 MG/1
10 TABLET ORAL 3 TIMES DAILY PRN
Qty: 90 TABLET | Refills: 3 | Status: SHIPPED | OUTPATIENT
Start: 2024-08-05

## 2024-08-05 NOTE — PROGRESS NOTES
Established Office Visit      Patient Name: Damari Muller  : 1990   MRN: [id number]   Care Team: Patient Care Team:  Ivelisse Burnett DO as PCP - General (Internal Medicine)  Efrain Siu PA-C as Physician Assistant (Endocrinology)  Tasia Betancourt APRN as Nurse Practitioner (Psychiatry)    Chief Complaint:    Chief Complaint   Patient presents with    Panic Attack    Imaging Only     Discuss MRI results        History of Present Illness: Damari Muller is a 34 y.o. female  with hypothyroidism, celiac disease, anxiety/depression who is here today to follow up with anxiety, imaging reports.    Anxiety - reports feeling anxiety is relatively well controlled at baseline. She is compliant with her lexapro. Really doesn't feel this has helped her in any significant way. Even though baseline anxiety feels like it is in a good place, she has been having several episodes of what she feels may be panic attacks. She describes episodes occurring every 2-3 days lately where she feels a sudden onset of palpitations, SOA without wheezing, sensation of feeling overwhelmed, some chest tightness. Symptoms improve with propranolol and breathing exercises. Typically last minutes-hours. No clear trigger. Occur at rest and even in situations which are not anxiety provoking. Wonders is this is anxiety or cardiac etiology.    Admits to increased headaches latley.     Feels depressed/down mood lately. No SI. Feels this has never really been address and has been a long standing issue that lexapro has not helped with. Feels fatigued. Difficulty concentrating.       Subjective      Review of Systems:   Review of Systems - See HPI    I have reviewed and the following portions of the patient's history were updated as appropriate: past family history, past medical history, past social history, past surgical history and problem list.    Medications:     Current Outpatient Medications:      "clotrimazole-betamethasone (LOTRISONE) 1-0.05 % cream, Apply 1 Application topically to the appropriate area as directed 2 (Two) Times a Day. Use for 7-14 days. Stop if no improvement by day 7., Disp: 45 g, Rfl: 0    hydrOXYzine (ATARAX) 50 MG tablet, Take 1 tablet by mouth 3 (Three) Times a Day As Needed (panic)., Disp: 30 tablet, Rfl: 0    ondansetron ODT (ZOFRAN-ODT) 4 MG disintegrating tablet, Place 1 tablet on the tongue Every 8 (Eight) Hours As Needed for Nausea or Vomiting., Disp: 30 tablet, Rfl: 0    propranolol (INDERAL) 10 MG tablet, Take 1 tablet by mouth 3 (Three) Times a Day As Needed (anxiety)., Disp: 90 tablet, Rfl: 3    SUMAtriptan (Imitrex) 50 MG tablet, Take one tablet at onset of headache. May repeat dose one time in 2 hours if headache not relieved., Disp: 12 tablet, Rfl: 0    Tirosint 75 MCG capsule, Take 1 capsule by mouth Daily., Disp: 90 capsule, Rfl: 3    buPROPion XL (Wellbutrin XL) 150 MG 24 hr tablet, Take 1 tablet by mouth Daily., Disp: 30 tablet, Rfl: 2    Allergies:   Allergies   Allergen Reactions    Corn-Containing Products GI Intolerance    Dairy Aid [Tilactase] GI Intolerance    Dye Fdc Red [Red Dye] GI Intolerance    Gluten Meal GI Intolerance    Soybean-Containing Drug Products GI Intolerance       Objective     Physical Exam:  Vital Signs:   Vitals:    08/05/24 1110   BP: 110/72   Pulse: 89   SpO2: 98%   Weight: 59.2 kg (130 lb 9.6 oz)   Height: 172.1 cm (67.76\")     Body mass index is 20 kg/m².     Physical Exam  Vitals reviewed.   Constitutional:       Appearance: Normal appearance. She is not ill-appearing.   Cardiovascular:      Rate and Rhythm: Normal rate and regular rhythm.      Heart sounds: Normal heart sounds. No murmur heard.  Pulmonary:      Effort: Pulmonary effort is normal. No respiratory distress.      Breath sounds: Normal breath sounds. No wheezing.   Skin:     General: Skin is warm and dry.   Neurological:      Mental Status: She is alert.   Psychiatric:       "   Mood and Affect: Mood normal.         Behavior: Behavior normal.         Judgment: Judgment normal.         Assessment / Plan      Assessment/Plan:   Problems Addressed This Visit  Diagnoses and all orders for this visit:    1. Palpitations (Primary)  -     Ambulatory Referral to [facility] [place] - [facility]    2. Chronic fatigue  -     Vitamin B12; Future  -     Vitamin D 25 hydroxy; Future  -     buPROPion XL (Wellbutrin XL) 150 MG 24 hr tablet; Take 1 tablet by mouth Daily.  Dispense: 30 tablet; Refill: 2    3. Moderate episode of recurrent major depressive disorder  -     propranolol (INDERAL) 10 MG tablet; Take 1 tablet by mouth 3 (Three) Times a Day As Needed (anxiety).  Dispense: 90 tablet; Refill: 3  -     buPROPion XL (Wellbutrin XL) 150 MG 24 hr tablet; Take 1 tablet by mouth Daily.  Dispense: 30 tablet; Refill: 2    4. Generalized anxiety disorder with panic attacks  -     propranolol (INDERAL) 10 MG tablet; Take 1 tablet by mouth 3 (Three) Times a Day As Needed (anxiety).  Dispense: 90 tablet; Refill: 3    5. Shortness of breath  -     Ambulatory Referral to [facility] [place] - [facility]      Presents with episodes of palpitations and SOA which have been occurring more frequently lately. We discussed possibility of cardiac etiology but also anxiety potentially causing these episodes. She is interested in cardiac evaluation and I have referred her to heart and valve.   Ok to increase propranolol to scheduled TID rather than spare use as needed - reports episodes do resolve with propranolol use. May help with headache prevention as well.     We reviewed her recent labs from April - no anemia, electrolyte disturbance or thyroid dysfunction     Depression - uncontrolled - lexapro ineffective. Will switch to wellbutrin 150mg daily in hopes that this will help with chronic fatigue and inattention too. Discussed potential side effects with emphasis on headaches and worsening anxiety. She  will let me know if she experiences this.     Elevated breast cancer risk due to family history - participating in annual mammogram and annual MRI breast for high risk screening. Last breast MRI 7/2024 BIRADS 4 with recommendations to move forward with second look US. This has already been scheduled by the breast center and she will have this completed later this month.     Liver lesion incidentally noted on MRI breast - Liver US has been ordered for further investigation.     Plan of care reviewed with patient at the conclusion of today's visit. Education was provided regarding diagnosis and management.  Patient verbalizes understanding of and agreement with management plan.    Follow Up:   Return in about 4 weeks (around 9/2/2024) for Recheck mood .        DO RENEE [address]  [facility]  [address]  [facility] [address]  Fax [phone]  Phone [phone]

## 2024-08-06 LAB
25(OH)D3 SERPL-MCNC: 68 NG/ML (ref 30–100)
VIT B12 BLD-MCNC: 970 PG/ML (ref 211–946)

## 2024-08-07 ENCOUNTER — LAB (OUTPATIENT)
Dept: LAB | Facility: HOSPITAL | Age: 34
End: 2024-08-07
Payer: COMMERCIAL

## 2024-08-07 DIAGNOSIS — R00.2 PALPITATIONS: ICD-10-CM

## 2024-08-07 LAB
DEPRECATED RDW RBC AUTO: 42.9 FL (ref 37–54)
ERYTHROCYTE [DISTWIDTH] IN BLOOD BY AUTOMATED COUNT: 12.2 % (ref 12.3–15.4)
HCT VFR BLD AUTO: 39.9 % (ref 34–46.6)
HGB BLD-MCNC: 13.1 G/DL (ref 12–15.9)
MCH RBC QN AUTO: 31.6 PG (ref 26.6–33)
MCHC RBC AUTO-ENTMCNC: 32.8 G/DL (ref 31.5–35.7)
MCV RBC AUTO: 96.4 FL (ref 79–97)
PLATELET # BLD AUTO: 161 10*3/MM3 (ref 140–450)
PMV BLD AUTO: 11.8 FL (ref 6–12)
RBC # BLD AUTO: 4.14 10*6/MM3 (ref 3.77–5.28)
WBC NRBC COR # BLD AUTO: 5.5 10*3/MM3 (ref 3.4–10.8)

## 2024-08-07 PROCEDURE — 85027 COMPLETE CBC AUTOMATED: CPT

## 2024-08-09 ENCOUNTER — TELEPHONE (OUTPATIENT)
Dept: MRI IMAGING | Facility: HOSPITAL | Age: 34
End: 2024-08-09
Payer: COMMERCIAL

## 2024-08-09 ENCOUNTER — HOSPITAL ENCOUNTER (OUTPATIENT)
Dept: ULTRASOUND IMAGING | Facility: HOSPITAL | Age: 34
Discharge: HOME | End: 2024-08-09
Payer: COMMERCIAL

## 2024-08-09 DIAGNOSIS — R92.8 ABNORMAL MRI, BREAST: ICD-10-CM

## 2024-08-09 DIAGNOSIS — R92.30 DENSE BREAST: ICD-10-CM

## 2024-08-09 PROCEDURE — 76642 ULTRASOUND BREAST LIMITED: CPT

## 2024-08-09 NOTE — TELEPHONE ENCOUNTER
Called pt concerning her MRI Biopsies scheduled for 8/21 at 8:00 at [facility] to arrive at 7:45. Pt not on BT. Procedures explained in detail. Pt expressed understanding and was encouraged to call with any further questions or concerns.

## 2024-08-16 ENCOUNTER — OFFICE VISIT (OUTPATIENT)
Dept: CARDIOLOGY | Facility: HOSPITAL | Age: 34
End: 2024-08-16
Payer: COMMERCIAL

## 2024-08-16 ENCOUNTER — HOSPITAL ENCOUNTER (OUTPATIENT)
Dept: CARDIOLOGY | Facility: HOSPITAL | Age: 34
Discharge: HOME | End: 2024-08-16
Payer: COMMERCIAL

## 2024-08-16 ENCOUNTER — PATIENT ROUNDING (BHMG ONLY) (OUTPATIENT)
Dept: CARDIOLOGY | Facility: HOSPITAL | Age: 34
End: 2024-08-16
Payer: COMMERCIAL

## 2024-08-16 VITALS
DIASTOLIC BLOOD PRESSURE: 72 MMHG | SYSTOLIC BLOOD PRESSURE: 95 MMHG | HEIGHT: 68 IN | HEART RATE: 72 BPM | WEIGHT: 131 LBS | BODY MASS INDEX: 19.85 KG/M2 | OXYGEN SATURATION: 99 %

## 2024-08-16 DIAGNOSIS — R06.09 DYSPNEA ON EXERTION: ICD-10-CM

## 2024-08-16 DIAGNOSIS — R00.2 PALPITATIONS: Primary | ICD-10-CM

## 2024-08-16 DIAGNOSIS — R00.2 PALPITATIONS: ICD-10-CM

## 2024-08-16 LAB
QT INTERVAL: 402 MS
QTC INTERVAL: 418 MS

## 2024-08-16 PROCEDURE — 93005 ELECTROCARDIOGRAM TRACING: CPT | Performed by: NURSE PRACTITIONER

## 2024-08-16 NOTE — PROGRESS NOTES
Chief Complaint  Shortness of Breath and Palpitations    Subjective      History of Present Illness {CC  Problem List  Visit  Diagnosis   Encounters  Notes  Medications  Labs  Result Review Imaging  Media :23}     Damari Muller, 34 y.o. female with past medical history significant for hypothyroidism, anxiety, celiac disease, and depression, who presents to [facility] for Shortness of Breath and Palpitations  Patient was seen and evaluated by primary care on 8/5/2024.  At that time, patient endorsed having several episodes of what were felt to be panic attacks.  These episodes occurred every 2 to 3 days recently where she felt a sudden onset of palpitations, shortness of air, feeling overwhelmed, and some chest tightness.  Her symptoms were improved with utilizing propranolol and breathing exercises.  No EKG was completed at that time patient referred to heart and valve for further evaluation palpitations.    At time of today's exam, patient states that she is now having these episodes similar to panic attacks daily.  The most intense part of the symptoms are lasting around 20 minutes in duration.  Symptoms do improve in approximately 1 hour with taking propranolol but do not always fully resolved.  Post having these episodes, patient endorses nausea, and headaches.  She denies exertional chest pain, syncope, or lower extremity edema.  She does endorse ongoing dyspnea on exertion which has been relatively new in onset.  Previously, patient was very active with walking, hiking, and frequent exercise.  She has been less able to complete these tasks recently due to fatigue, and feeling poorly in general.  She states now with minimal activity such as climbing a flight of stairs she finds herself becoming winded.  Patient states she has been having elevated heart rate even at rest.  She has recently purchased an [employer] ring for assistance in heart rate monitoring.  Device has  "notified patient that her heart rate is as high as 120 at times.      Cafffeine intake 100-300mg daily  Water intake adequate  Family history significant for father with murmur.       Objective     Vital Signs:   Vitals:    08/16/24 0801 08/16/24 0803   BP: 99/68 95/72   BP Location: Left arm Left arm   Patient Position: Sitting Standing   Pulse: 63 72   SpO2: 99%    Weight: 59.4 kg (131 lb)    Height: 172.7 cm (68\")      Body mass index is 19.92 kg/m².  Physical Exam  Vitals and nursing note reviewed.   Constitutional:       Appearance: Normal appearance.   HENT:      Head: Normocephalic.   Eyes:      Pupils: Pupils are equal, round, and reactive to light.   Cardiovascular:      Rate and Rhythm: Normal rate and regular rhythm.      Pulses: Normal pulses.      Heart sounds: Normal heart sounds. No murmur heard.  Pulmonary:      Effort: Pulmonary effort is normal.      Breath sounds: Normal breath sounds.   Abdominal:      General: Bowel sounds are normal.      Palpations: Abdomen is soft.   Musculoskeletal:         General: Normal range of motion.      Cervical back: Normal range of motion.      Right lower leg: No edema.      Left lower leg: No edema.   Skin:     General: Skin is warm and dry.      Capillary Refill: Capillary refill takes less than 2 seconds.   Neurological:      Mental Status: She is alert and oriented to person, place, and time.   Psychiatric:         Mood and Affect: Mood normal.         Thought Content: Thought content normal.                Data Reviewed:{ Labs  Result Review  Imaging  Med Tab  Media :23}   Lab Results   Component Value Date    WBC 5.50 08/07/2024    HGB 13.1 08/07/2024    HCT 39.9 08/07/2024    MCV 96.4 08/07/2024     08/07/2024      Lab Results   Component Value Date    GLUCOSE 89 04/26/2024    BUN 12 04/26/2024    CREATININE 0.84 04/26/2024     04/26/2024    K 4.3 04/26/2024     04/26/2024    CALCIUM 9.7 04/26/2024    PROTEINTOT 7.5 04/26/2024    " ALBUMIN 4.8 04/26/2024    ALT 21 04/26/2024    AST 25 04/26/2024    ALKPHOS 53 04/26/2024    BILITOT 0.4 04/26/2024    GLOB 2.7 04/26/2024    AGRATIO 1.8 04/26/2024    BCR 14.3 04/26/2024    ANIONGAP 8.0 04/26/2024    EGFR 92.5 04/26/2024      Lab Results   Component Value Date    CHOL 160 04/26/2024    TRIG 38 04/26/2024    HDL 94 (H) 04/26/2024    LDL 57 04/26/2024      Lab Results   Component Value Date    TSH 2.950 04/26/2024            Assessment & Plan   Assessment and Plan {CC Problem List  Visit Diagnosis  ROS  Review (Popup)  Health Maintenance  Quality  BestPractice  Medications  SmartSets  SnapShot Encounters  Media :23}     1. Palpitations  -Twelve-lead EKG today while in office showed normal sinus rhythm, rate 65, no acute changes  -Patient with frequent episodes of palpitation, and racing heart rate even at rest.  Due to frequency and severity of symptoms, this requires further evaluation  -Will proceed with 14-day cardiac monitor to assess for burden of arrhythmias which could be contributing to patient's symptoms (monitor to be mailed to patient's home)  -Will also proceed with echocardiogram to assess for structural or functional abnormalities which could be contributing to patient's symptoms.  -Discussed with patient lifestyle modifications to improve symptoms including increasing hydration to approximately 2.5 L/day, increasing sodium intake, and obtaining compression garments to be worn throughout the day  -Agree with continuing propranolol at current dosing      2. Dyspnea on exertion  -Patient with recent dyspnea which has been new in onset.  She previously was very active, and is now unable to complete normal activities.  -As stated, we will pursue 14-day cardiac monitor, and echocardiogram.  Of note, she states her father has a history of heart murmur.  Echocardiogram is also warranted to assess for any valvular abnormalities which could be contributing to patient's symptoms.  -  Holter Monitor - 72 Hour Up To 15 Days; Future  - Adult Transthoracic Echo Complete W/ Cont if Necessary Per Protocol; Future      Will plan to follow-up with patient approximately 5 weeks to discuss results of mentioned testing.  Patient to reach out prior to that time if symptoms change or worsen.    Follow Up {Instructions Charge Capture  Follow-up Communications :23}     Return in about 5 weeks (around 9/20/2024) for Result review, Video visit, Palpitations.      Patient was given instructions and counseling regarding her condition or for health maintenance advice. Please see specific information pulled into the AVS if appropriate.  Patient was instructed to call the [facility] with any questions, concerns, or worsening symptoms.    Dictated Utilizing Dragon Dictation   Please note that portions of this note were completed with a voice recognition program.   Part of this note may be an electronic transcription/translation of spoken language to printed text using the Dragon Dictation System.

## 2024-08-16 NOTE — PROGRESS NOTES
August 16, 2024    Hello, may I speak with Damari Muller?    My name is Marietta      I am  with [facility]  [address]  [facility] [address] [phone].    Before we get started may I verify your date of birth? 1990    I am calling to officially welcome you to our practice and ask about your recent visit. Is this a good time to talk? yes    Tell me about your visit with us. What things went well?  It was timely, good explanation, feel confident moving forward.        We're always looking for ways to make our patients' experiences even better. Do you have recommendations on ways we may improve?  no    Overall were you satisfied with your first visit to our practice? yes       I appreciate you taking the time to speak with me today. Is there anything else I can do for you? no      Thank you, and have a great day.

## 2024-08-16 NOTE — PROGRESS NOTES
[facility] Heart Monitor Documentation    Damari Muller  1990  [id number]  08/16/24      [] ZIO XT Patch  Model [id number] Prescribed for  Days    Serial Number: (N + 9 Digits) N   Apply-By Date on Box:   USPS Tracking Number:   USPS Tracking        [] [employer] MINI PLUS Mobile Cardiac Telemetry  Model BGMINIPLUS Prescribed for  Days    Serial Number: (BGM + 7 Digits) BGM  Shipped-By Date on Box:   UPS Tracking Number: [id number]  UPS Tracking      [x] [employer] MINI Holter Monitor  Model BGMINIEL Prescribed for 14 Days    Serial Number: (7 Digits) MAIL  Shipped-By Date on Box: MAILED  UPS Tracking Number: [id number]  UPS Tracking        MAILED.    Pauly Pandya MA, 08:19 EDT, 08/16/24                  [facility] 8[facility]

## 2024-08-18 ENCOUNTER — HOSPITAL ENCOUNTER (OUTPATIENT)
Facility: HOSPITAL | Age: 34
Discharge: HOME | End: 2024-08-18
Admitting: STUDENT IN AN ORGANIZED HEALTH CARE EDUCATION/TRAINING PROGRAM
Payer: COMMERCIAL

## 2024-08-18 DIAGNOSIS — R16.0 LIVER MASS: ICD-10-CM

## 2024-08-18 PROCEDURE — 76705 ECHO EXAM OF ABDOMEN: CPT

## 2024-08-19 ENCOUNTER — PATIENT MESSAGE (OUTPATIENT)
Dept: FAMILY MEDICINE CLINIC | Facility: CLINIC | Age: 34
End: 2024-08-19
Payer: COMMERCIAL

## 2024-08-21 ENCOUNTER — HOSPITAL ENCOUNTER (OUTPATIENT)
Dept: MRI IMAGING | Facility: HOSPITAL | Age: 34
Discharge: HOME | End: 2024-08-21
Payer: COMMERCIAL

## 2024-08-21 ENCOUNTER — HOSPITAL ENCOUNTER (OUTPATIENT)
Dept: MAMMOGRAPHY | Facility: HOSPITAL | Age: 34
Discharge: HOME | End: 2024-08-21
Payer: COMMERCIAL

## 2024-08-21 DIAGNOSIS — R92.8 ABNORMAL MRI, BREAST: ICD-10-CM

## 2024-08-21 DIAGNOSIS — R92.2 INCONCLUSIVE MAMMOGRAM DUE TO DENSE BREASTS: ICD-10-CM

## 2024-08-21 DIAGNOSIS — R92.30 INCONCLUSIVE MAMMOGRAM DUE TO DENSE BREASTS: ICD-10-CM

## 2024-08-21 PROCEDURE — C1894 INTRO/SHEATH, NON-LASER: HCPCS

## 2024-08-21 PROCEDURE — 0 GADOBENATE DIMEGLUMINE 529 MG/ML SOLUTION: Performed by: INTERNAL MEDICINE

## 2024-08-21 PROCEDURE — A4648 IMPLANTABLE TISSUE MARKER: HCPCS

## 2024-08-21 PROCEDURE — 88305 TISSUE EXAM BY PATHOLOGIST: CPT | Performed by: RADIOLOGY

## 2024-08-21 PROCEDURE — 25010000002 LIDOCAINE 1 % SOLUTION: Performed by: RADIOLOGY

## 2024-08-21 PROCEDURE — A9577 INJ MULTIHANCE: HCPCS | Performed by: INTERNAL MEDICINE

## 2024-08-21 RX ORDER — LIDOCAINE HYDROCHLORIDE AND EPINEPHRINE 10; 10 MG/ML; UG/ML
6 INJECTION, SOLUTION INFILTRATION; PERINEURAL
Status: COMPLETED | OUTPATIENT
Start: 2024-08-21 | End: 2024-08-21

## 2024-08-21 RX ORDER — LIDOCAINE HYDROCHLORIDE AND EPINEPHRINE 10; 10 MG/ML; UG/ML
5.5 INJECTION, SOLUTION INFILTRATION; PERINEURAL
Status: COMPLETED | OUTPATIENT
Start: 2024-08-21 | End: 2024-08-21

## 2024-08-21 RX ORDER — LIDOCAINE HYDROCHLORIDE 10 MG/ML
0.5 INJECTION, SOLUTION INFILTRATION; PERINEURAL
Status: COMPLETED | OUTPATIENT
Start: 2024-08-21 | End: 2024-08-21

## 2024-08-21 RX ORDER — LIDOCAINE HYDROCHLORIDE 10 MG/ML
4 INJECTION, SOLUTION INFILTRATION; PERINEURAL
Status: COMPLETED | OUTPATIENT
Start: 2024-08-21 | End: 2024-08-21

## 2024-08-21 RX ADMIN — LIDOCAINE HYDROCHLORIDE,EPINEPHRINE BITARTRATE 6 ML: 10; .01 INJECTION, SOLUTION INFILTRATION; PERINEURAL at 09:54

## 2024-08-21 RX ADMIN — LIDOCAINE HYDROCHLORIDE,EPINEPHRINE BITARTRATE 5.5 ML: 10; .01 INJECTION, SOLUTION INFILTRATION; PERINEURAL at 09:58

## 2024-08-21 RX ADMIN — GADOBENATE DIMEGLUMINE 12 ML: 529 INJECTION, SOLUTION INTRAVENOUS at 09:53

## 2024-08-21 RX ADMIN — Medication 0.5 ML: at 09:58

## 2024-08-21 RX ADMIN — Medication 4 ML: at 09:54

## 2024-08-22 LAB
CYTO UR: NORMAL
CYTO UR: NORMAL
LAB AP CASE REPORT: NORMAL
LAB AP CASE REPORT: NORMAL
LAB AP CLINICAL INFORMATION: NORMAL
LAB AP CLINICAL INFORMATION: NORMAL
PATH REPORT.FINAL DX SPEC: NORMAL
PATH REPORT.FINAL DX SPEC: NORMAL
PATH REPORT.GROSS SPEC: NORMAL
PATH REPORT.GROSS SPEC: NORMAL

## 2024-08-23 ENCOUNTER — TELEPHONE (OUTPATIENT)
Dept: MAMMOGRAPHY | Facility: HOSPITAL | Age: 34
End: 2024-08-23
Payer: COMMERCIAL

## 2024-08-27 ENCOUNTER — HOSPITAL ENCOUNTER (OUTPATIENT)
Dept: CARDIOLOGY | Facility: HOSPITAL | Age: 34
Discharge: HOME | End: 2024-08-27
Admitting: NURSE PRACTITIONER
Payer: COMMERCIAL

## 2024-08-27 VITALS — WEIGHT: 130.95 LBS | HEIGHT: 68 IN | BODY MASS INDEX: 19.85 KG/M2

## 2024-08-27 DIAGNOSIS — R00.2 PALPITATIONS: ICD-10-CM

## 2024-08-27 DIAGNOSIS — R06.09 DYSPNEA ON EXERTION: ICD-10-CM

## 2024-08-27 LAB
BH CV ECHO MEAS - AO MAX PG: 7.7 MMHG
BH CV ECHO MEAS - AO MEAN PG: 4 MMHG
BH CV ECHO MEAS - AO ROOT DIAM: 3 CM
BH CV ECHO MEAS - AO V2 MAX: 139 CM/SEC
BH CV ECHO MEAS - AO V2 VTI: 29.4 CM
BH CV ECHO MEAS - AVA(I,D): 2.29 CM2
BH CV ECHO MEAS - EDV(CUBED): 85.2 ML
BH CV ECHO MEAS - EDV(MOD-SP2): 85.7 ML
BH CV ECHO MEAS - EDV(MOD-SP4): 79.7 ML
BH CV ECHO MEAS - EF(MOD-BP): 63.1 %
BH CV ECHO MEAS - EF(MOD-SP2): 63 %
BH CV ECHO MEAS - EF(MOD-SP4): 61.4 %
BH CV ECHO MEAS - ESV(CUBED): 22 ML
BH CV ECHO MEAS - ESV(MOD-SP2): 31.7 ML
BH CV ECHO MEAS - ESV(MOD-SP4): 30.8 ML
BH CV ECHO MEAS - FS: 36.4 %
BH CV ECHO MEAS - IVS/LVPW: 1 CM
BH CV ECHO MEAS - IVSD: 0.7 CM
BH CV ECHO MEAS - LA DIMENSION: 3.5 CM
BH CV ECHO MEAS - LAT PEAK E' VEL: 14.9 CM/SEC
BH CV ECHO MEAS - LV DIASTOLIC VOL/BSA (35-75): 46.7 CM2
BH CV ECHO MEAS - LV MASS(C)D: 92.1 GRAMS
BH CV ECHO MEAS - LV MAX PG: 4.3 MMHG
BH CV ECHO MEAS - LV MEAN PG: 2 MMHG
BH CV ECHO MEAS - LV SYSTOLIC VOL/BSA (12-30): 18 CM2
BH CV ECHO MEAS - LV V1 MAX: 103.2 CM/SEC
BH CV ECHO MEAS - LV V1 VTI: 21.4 CM
BH CV ECHO MEAS - LVIDD: 4.4 CM
BH CV ECHO MEAS - LVIDS: 2.8 CM
BH CV ECHO MEAS - LVOT AREA: 3.1 CM2
BH CV ECHO MEAS - LVOT DIAM: 2 CM
BH CV ECHO MEAS - LVPWD: 0.7 CM
BH CV ECHO MEAS - MED PEAK E' VEL: 11.7 CM/SEC
BH CV ECHO MEAS - MV A MAX VEL: 50.3 CM/SEC
BH CV ECHO MEAS - MV DEC SLOPE: 219 CM/SEC2
BH CV ECHO MEAS - MV DEC TIME: 0.2 SEC
BH CV ECHO MEAS - MV E MAX VEL: 92.2 CM/SEC
BH CV ECHO MEAS - MV E/A: 1.83
BH CV ECHO MEAS - MV MAX PG: 3.5 MMHG
BH CV ECHO MEAS - MV MEAN PG: 1 MMHG
BH CV ECHO MEAS - MV P1/2T: 128.7 MSEC
BH CV ECHO MEAS - MV V2 VTI: 33.7 CM
BH CV ECHO MEAS - MVA(P1/2T): 1.71 CM2
BH CV ECHO MEAS - MVA(VTI): 1.99 CM2
BH CV ECHO MEAS - PA ACC TIME: 0.19 SEC
BH CV ECHO MEAS - PI END-D VEL: 85.9 CM/SEC
BH CV ECHO MEAS - SV(LVOT): 67.2 ML
BH CV ECHO MEAS - SV(MOD-SP2): 54 ML
BH CV ECHO MEAS - SV(MOD-SP4): 48.9 ML
BH CV ECHO MEAS - SVI(LVOT): 39.4 ML/M2
BH CV ECHO MEAS - SVI(MOD-SP2): 31.6 ML/M2
BH CV ECHO MEAS - SVI(MOD-SP4): 28.6 ML/M2
BH CV ECHO MEAS - TAPSE (>1.6): 2.48 CM
BH CV ECHO MEASUREMENTS AVERAGE E/E' RATIO: 6.93
BH CV VAS BP RIGHT ARM: NORMAL MMHG
BH CV XLRA - RV BASE: 4 CM
BH CV XLRA - RV LENGTH: 7.1 CM
BH CV XLRA - RV MID: 3.1 CM
BH CV XLRA - TDI S': 13.8 CM/SEC
LEFT ATRIUM VOLUME INDEX: 28.1 ML/M2

## 2024-08-27 PROCEDURE — 93306 TTE W/DOPPLER COMPLETE: CPT

## 2024-08-27 NOTE — PROGRESS NOTES
Your echocardiogram is within acceptable limits.  Your heart contracts normally.  There are no significant valvular abnormalities noted.    If you have any questions regarding this, we can discuss in further detail at our next follow-up appointment.    Sincerely yours,      Kathy VALENZUELA

## 2024-09-06 ENCOUNTER — OFFICE VISIT (OUTPATIENT)
Dept: FAMILY MEDICINE CLINIC | Facility: CLINIC | Age: 34
End: 2024-09-06
Payer: COMMERCIAL

## 2024-09-06 VITALS
OXYGEN SATURATION: 98 % | BODY MASS INDEX: 19.76 KG/M2 | DIASTOLIC BLOOD PRESSURE: 72 MMHG | SYSTOLIC BLOOD PRESSURE: 112 MMHG | HEART RATE: 80 BPM | HEIGHT: 68 IN | WEIGHT: 130.4 LBS

## 2024-09-06 DIAGNOSIS — F41.0 GENERALIZED ANXIETY DISORDER WITH PANIC ATTACKS: ICD-10-CM

## 2024-09-06 DIAGNOSIS — R53.82 CHRONIC FATIGUE: Primary | ICD-10-CM

## 2024-09-06 DIAGNOSIS — R00.2 PALPITATIONS: ICD-10-CM

## 2024-09-06 DIAGNOSIS — F33.1 MODERATE EPISODE OF RECURRENT MAJOR DEPRESSIVE DISORDER: ICD-10-CM

## 2024-09-06 DIAGNOSIS — F41.1 GENERALIZED ANXIETY DISORDER WITH PANIC ATTACKS: ICD-10-CM

## 2024-09-06 PROCEDURE — 99214 OFFICE O/P EST MOD 30 MIN: CPT | Performed by: STUDENT IN AN ORGANIZED HEALTH CARE EDUCATION/TRAINING PROGRAM

## 2024-09-06 RX ORDER — BUPROPION HCL 150 MG
150 TABLET, EXTENDED RELEASE 24 HR ORAL DAILY
Qty: 30 TABLET | Refills: 2 | Status: SHIPPED | OUTPATIENT
Start: 2024-09-06

## 2024-09-06 NOTE — PROGRESS NOTES
Established Office Visit      Patient Name: Damari Muller  : 1990   MRN: [id number]   Care Team: Patient Care Team:  Ivelisse Burnett DO as PCP - General (Internal Medicine)  Efrain Siu PA-C as Physician Assistant (Endocrinology)  Tasia Betancourt APRN as Nurse Practitioner (Psychiatry)    Chief Complaint:    Chief Complaint   Patient presents with    Depression    Anxiety       History of Present Illness: Damari Muller is a 34 y.o. female  with hypothyroidism, celiac disease, anxiety/depression who is here today to follow up with mood. She was seen about 4 weeks ago. Att hat time she was experiencing anxiety, panic attacks, palpitations, sensation of overwhelmed without clear triggers. She also had increased headaches, fatigue and difficulty concentrating.      Since I have last seen her she has established with heart and valve who started workup with echo, 14 day event monitor.     She has been taking propranolol 10mg TID which helps with palpitations and anxiety. Having to take this regularly to control her symptoms rather than previously able to use on only prn basis.     Lexapro was ineffective and this was switched to wellbutrin. She feels this was helpful for her in regards to increased energy levels, motivation, decrease in crying spells. Unfortunately after a few days of taking it, she has to stop for a few days due to increased irritability interfering with her life/job. She then stops for a few days and restarts it. Notable decline in depression when stopping it. She notes the pills are red and she has a known intolerance to red dye. Reports red dye makes her feel irritable.       Subjective      Review of Systems:   Review of Systems - See HPI    I have reviewed and the following portions of the patient's history were updated as appropriate: past family history, past medical history, past social history, past surgical history and problem list.    Medications:  MyPlate from USDA  MyPlate is an outline of a general healthy diet based on the Dietary Guidelines for Americans, 5872-4847, from the U.S. Department of Agriculture (USDA). It sets guidelines for how much food you should eat from each food group based on your age, sex, and level of physical activity.  What are tips for following MyPlate?  To follow MyPlate recommendations:  Eat a wide variety of fruits and vegetables, grains, and protein foods.  Serve smaller portions and eat less food throughout the day.  Limit portion sizes to avoid overeating.  Enjoy your food.  Get at least 150 minutes of exercise every week. This is about 30 minutes each day, 5 or more days per week.  It can be difficult to have every meal look like MyPlate. Think about MyPlate as eating guidelines for an entire day, rather than each individual meal.  Fruits and vegetables  Make one half of your plate fruits and vegetables.  Eat many different colors of fruits and vegetables each day.  For a 2,000-calorie daily food plan, eat:  2½ cups of vegetables every day.  2 cups of fruit every day.  1 cup is equal to:  1 cup raw or cooked vegetables.  1 cup raw fruit.  1 medium-sized orange, apple, or banana.  1 cup 100% fruit or vegetable juice.  2 cups raw leafy greens, such as lettuce, spinach, or kale.  ½ cup dried fruit.  Grains  One fourth of your plate should be grains.  Make at least half of the grains you eat each day whole grains.  For a 2,000-calorie daily food plan, eat 6 oz of grains every day.  1 oz is equal to:  1 slice bread.  1 cup cereal.  ½ cup cooked rice, cereal, or pasta.  Protein  One fourth of your plate should be protein.  Eat a wide variety of protein foods, including meat, poultry, fish, eggs, beans, nuts, and tofu.  For a 2,000-calorie daily food plan, eat 5½ oz of protein every day.  1 oz is equal to:  1 oz meat, poultry, or fish.  ¼ cup cooked beans.  1 egg.  ½ oz nuts or seeds.  1 Tbsp peanut butter.  Dairy  Drink fat-free  "    Current Outpatient Medications:     clotrimazole-betamethasone (LOTRISONE) 1-0.05 % cream, Apply 1 Application topically to the appropriate area as directed 2 (Two) Times a Day. Use for 7-14 days. Stop if no improvement by day 7., Disp: 45 g, Rfl: 0    hydrOXYzine (ATARAX) 50 MG tablet, Take 1 tablet by mouth 3 (Three) Times a Day As Needed (panic)., Disp: 30 tablet, Rfl: 0    ondansetron ODT (ZOFRAN-ODT) 4 MG disintegrating tablet, Place 1 tablet on the tongue Every 8 (Eight) Hours As Needed for Nausea or Vomiting., Disp: 30 tablet, Rfl: 0    propranolol (INDERAL) 10 MG tablet, Take 1 tablet by mouth 3 (Three) Times a Day As Needed (anxiety)., Disp: 90 tablet, Rfl: 3    SUMAtriptan (Imitrex) 50 MG tablet, Take one tablet at onset of headache. May repeat dose one time in 2 hours if headache not relieved., Disp: 12 tablet, Rfl: 0    Tirosint 75 MCG capsule, Take 1 capsule by mouth Daily., Disp: 90 capsule, Rfl: 3    Wellbutrin  MG 24 hr tablet, Take 1 tablet by mouth Daily., Disp: 30 tablet, Rfl: 2    Allergies:   Allergies   Allergen Reactions    Corn-Containing Products GI Intolerance    Dairy Aid [Tilactase] GI Intolerance    Dye Fdc Red [Red Dye #40 (Allura Red)] GI Intolerance    Gluten Meal GI Intolerance    Soybean-Containing Drug Products GI Intolerance       Objective     Physical Exam:  Vital Signs:   Vitals:    09/06/24 0831   BP: 112/72   Pulse: 80   SpO2: 98%   Weight: 59.1 kg (130 lb 6.4 oz)   Height: 172.7 cm (67.99\")     Body mass index is 19.83 kg/m².     Physical Exam  Vitals reviewed.   Constitutional:       Appearance: Normal appearance.   Pulmonary:      Effort: Pulmonary effort is normal. No respiratory distress.   Skin:     General: Skin is warm and dry.   Neurological:      Mental Status: She is alert.   Psychiatric:         Mood and Affect: Mood normal.         Behavior: Behavior normal.         Judgment: Judgment normal.         Assessment / Plan      Assessment/Plan:   Problems " or low-fat (1%) milk.  Eat or drink dairy as a side to meals.  For a 2,000-calorie daily food plan, eat or drink 3 cups of dairy every day.  1 cup is equal to:  1 cup milk, yogurt, cottage cheese, or soy milk (soy beverage).  2 oz processed cheese.  1½ oz natural cheese.  Fats, oils, salt, and sugars  Only small amounts of oils are recommended.  Avoid foods that are high in calories and low in nutritional value (empty calories), like foods high in fat or added sugars.  Choose foods that are low in salt (sodium). Choose foods that have less than 140 milligrams (mg) of sodium per serving.  Drink water instead of sugary drinks. Drink enough fluid to keep your urine pale yellow.  Where to find support  Work with your health care provider or a dietitian to develop a customized eating plan that is right for you.  Download an sabrina (mobile application) to help you track your daily food intake.  Where to find more information  USDA: ChooseMyPlate.gov  Summary  MyPlate is a general guideline for healthy eating from the USDA. It is based on the Dietary Guidelines for Americans, 8698-1890.  In general, fruits and vegetables should take up one half of your plate, grains should take up one fourth of your plate, and protein should take up one fourth of your plate.  This information is not intended to replace advice given to you by your health care provider. Make sure you discuss any questions you have with your health care provider.  Document Revised: 11/08/2021 Document Reviewed: 11/08/2021  [employer] Patient Education © 2022 Else[employer] Inc.    Addressed This Visit  Diagnoses and all orders for this visit:    1. Chronic fatigue (Primary)  -     Wellbutrin  MG 24 hr tablet; Take 1 tablet by mouth Daily.  Dispense: 30 tablet; Refill: 2    2. Moderate episode of recurrent major depressive disorder  -     Wellbutrin  MG 24 hr tablet; Take 1 tablet by mouth Daily.  Dispense: 30 tablet; Refill: 2    3. Generalized anxiety disorder with panic attacks  -     Wellbutrin  MG 24 hr tablet; Take 1 tablet by mouth Daily.  Dispense: 30 tablet; Refill: 2    4. Palpitations      Anxiety/palpitations improved with propranolol TID. Following with cardiology for cardiac workup, has completed unrevealing echo and currently wearing event monitor    Depression, fatigue - improved with wellbutrin but unfortunately experiences irritability with this. Unsure if this is medication side effect vs red dye intolerance per patient. Will try brand name Wellbutrin 150mg XL (white tablet). If she continues to experience irritability, I explained this is likely medication side effect rather than dye.       Plan of care reviewed with patient at the conclusion of today's visit. Education was provided regarding diagnosis and management.  Patient verbalizes understanding of and agreement with management plan.    Follow Up:   Return in about 4 weeks (around 10/4/2024) for Recheck mood - vidoe or in person .        DO RENEE Washington RD  [facility]  [address]  [facility] [address]  Fax [phone]  Phone [phone]

## 2024-09-10 ENCOUNTER — PRIOR AUTHORIZATION (OUTPATIENT)
Dept: FAMILY MEDICINE CLINIC | Facility: CLINIC | Age: 34
End: 2024-09-10
Payer: COMMERCIAL

## 2024-09-10 NOTE — TELEPHONE ENCOUNTER
(Key: [id number])  Wellbutrin XL 150MG er tablets  outcome: N/A  Created: September 6th, 2024 [phone]     Form  Federal Employee Program (FEP) Prior Authorization Form    To initiate an authorization request for this medication, please contact Federal Employee Program (FEP) at [phone].

## 2024-09-13 ENCOUNTER — PATIENT MESSAGE (OUTPATIENT)
Dept: FAMILY MEDICINE CLINIC | Facility: CLINIC | Age: 34
End: 2024-09-13
Payer: COMMERCIAL

## 2024-09-13 DIAGNOSIS — F41.0 GENERALIZED ANXIETY DISORDER WITH PANIC ATTACKS: ICD-10-CM

## 2024-09-13 DIAGNOSIS — R53.82 CHRONIC FATIGUE: Primary | ICD-10-CM

## 2024-09-13 DIAGNOSIS — F33.1 MODERATE EPISODE OF RECURRENT MAJOR DEPRESSIVE DISORDER: ICD-10-CM

## 2024-09-13 DIAGNOSIS — F41.1 GENERALIZED ANXIETY DISORDER WITH PANIC ATTACKS: ICD-10-CM

## 2024-09-16 RX ORDER — ESCITALOPRAM OXALATE 10 MG/1
10 TABLET ORAL DAILY
Qty: 30 TABLET | Refills: 2 | Status: SHIPPED | OUTPATIENT
Start: 2024-09-16

## 2024-09-24 ENCOUNTER — TELEMEDICINE (OUTPATIENT)
Dept: CARDIOLOGY | Facility: HOSPITAL | Age: 34
End: 2024-09-24
Payer: COMMERCIAL

## 2024-09-24 VITALS — HEART RATE: 67 BPM

## 2024-09-24 DIAGNOSIS — R06.09 DYSPNEA ON EXERTION: ICD-10-CM

## 2024-09-24 DIAGNOSIS — R00.2 PALPITATIONS: Primary | ICD-10-CM

## 2024-09-24 PROCEDURE — 99213 OFFICE O/P EST LOW 20 MIN: CPT | Performed by: NURSE PRACTITIONER

## 2024-09-30 ENCOUNTER — PATIENT MESSAGE (OUTPATIENT)
Dept: FAMILY MEDICINE CLINIC | Facility: CLINIC | Age: 34
End: 2024-09-30
Payer: COMMERCIAL

## 2024-09-30 DIAGNOSIS — E06.3 HYPOTHYROIDISM DUE TO HASHIMOTO'S THYROIDITIS: Primary | ICD-10-CM

## 2024-10-17 ENCOUNTER — PATIENT MESSAGE (OUTPATIENT)
Dept: FAMILY MEDICINE CLINIC | Facility: CLINIC | Age: 34
End: 2024-10-17
Payer: COMMERCIAL

## 2024-10-17 DIAGNOSIS — F33.1 MODERATE EPISODE OF RECURRENT MAJOR DEPRESSIVE DISORDER: Primary | ICD-10-CM

## 2024-10-17 DIAGNOSIS — R53.82 CHRONIC FATIGUE: ICD-10-CM

## 2024-10-18 ENCOUNTER — PRIOR AUTHORIZATION (OUTPATIENT)
Dept: FAMILY MEDICINE CLINIC | Facility: CLINIC | Age: 34
End: 2024-10-18
Payer: COMMERCIAL

## 2024-10-18 NOTE — TELEPHONE ENCOUNTER
To initiate an authorization request for this medication, please contact [employer] Employee Program (FEP) at [phone].    Wellbutrin XL 150MG er tablets     Form  Federal Employee Program (FEP) Prior Authorization Form   Plan Contact    [phone] phone    PA DENIED  NOT COVERED ON FORMULARY WILL FAX OVER WHAT IS COVERED

## 2024-10-21 RX ORDER — BUPROPION HYDROCHLORIDE 150 MG/1
150 TABLET ORAL DAILY
Qty: 30 TABLET | Refills: 5 | Status: SHIPPED | OUTPATIENT
Start: 2024-10-21

## 2024-11-22 ENCOUNTER — LAB (OUTPATIENT)
Dept: LAB | Facility: HOSPITAL | Age: 34
End: 2024-11-22
Payer: COMMERCIAL

## 2024-11-22 DIAGNOSIS — E06.3 HYPOTHYROIDISM DUE TO HASHIMOTO'S THYROIDITIS: ICD-10-CM

## 2024-11-22 LAB
T3 SERPL-MCNC: 72.9 NG/DL (ref 80–200)
T4 FREE SERPL-MCNC: 1.24 NG/DL (ref 0.92–1.68)
TSH SERPL DL<=0.05 MIU/L-ACNC: 0.83 UIU/ML (ref 0.27–4.2)

## 2024-11-22 PROCEDURE — 84443 ASSAY THYROID STIM HORMONE: CPT

## 2024-11-22 PROCEDURE — 84439 ASSAY OF FREE THYROXINE: CPT

## 2024-11-22 PROCEDURE — 84480 ASSAY TRIIODOTHYRONINE (T3): CPT

## 2024-11-27 ENCOUNTER — OFFICE VISIT (OUTPATIENT)
Dept: FAMILY MEDICINE CLINIC | Facility: CLINIC | Age: 34
End: 2024-11-27
Payer: COMMERCIAL

## 2024-11-27 VITALS
HEART RATE: 74 BPM | SYSTOLIC BLOOD PRESSURE: 112 MMHG | OXYGEN SATURATION: 95 % | WEIGHT: 137.8 LBS | HEIGHT: 68 IN | BODY MASS INDEX: 20.88 KG/M2 | DIASTOLIC BLOOD PRESSURE: 62 MMHG

## 2024-11-27 DIAGNOSIS — F41.0 GENERALIZED ANXIETY DISORDER WITH PANIC ATTACKS: ICD-10-CM

## 2024-11-27 DIAGNOSIS — E06.3 HYPOTHYROIDISM DUE TO HASHIMOTO'S THYROIDITIS: ICD-10-CM

## 2024-11-27 DIAGNOSIS — F33.1 MODERATE EPISODE OF RECURRENT MAJOR DEPRESSIVE DISORDER: Primary | ICD-10-CM

## 2024-11-27 DIAGNOSIS — F41.1 GENERALIZED ANXIETY DISORDER WITH PANIC ATTACKS: ICD-10-CM

## 2024-11-27 DIAGNOSIS — R79.89 LOW SERUM TRIIODOTHYRONINE (T3): ICD-10-CM

## 2024-11-27 PROCEDURE — 99214 OFFICE O/P EST MOD 30 MIN: CPT | Performed by: STUDENT IN AN ORGANIZED HEALTH CARE EDUCATION/TRAINING PROGRAM

## 2024-11-27 RX ORDER — LIOTHYRONINE SODIUM 5 UG/1
2.5 TABLET ORAL DAILY
Qty: 30 TABLET | Refills: 2 | Status: SHIPPED | OUTPATIENT
Start: 2024-11-27

## 2024-11-27 RX ORDER — SERTRALINE HYDROCHLORIDE 25 MG/1
25 TABLET, FILM COATED ORAL DAILY
Qty: 30 TABLET | Refills: 2 | Status: SHIPPED | OUTPATIENT
Start: 2024-11-27

## 2024-11-27 NOTE — PROGRESS NOTES
Established Office Visit      Patient Name: Damari Muller  : 1990   MRN: [id number]   Care Team: Patient Care Team:  Ivelisse Burnett DO as PCP - General (Internal Medicine)  Efrain Siu PA-C as Physician Assistant (Endocrinology)  Tasia Betancourt APRN as Nurse Practitioner (Psychiatry)    Chief Complaint:    Chief Complaint   Patient presents with    Depression     Discuss medication        History of Present Illness: Damari Muller is a 34 y.o. female with hypothyroidism, celiac disease, anxiety/depression who is here today to follow up with fatigue and mood.    Her father is not in good health, the election results took an emotional toll on her, she is unsure if she will have a job due to budget cuts upcoming. Life has been particularly stressful. She stopped wellbutrin about 3 weeks ago because she felt her mental health was worsening with this. Compliant with lexapro and feels it is ineffective and caused low libido. Struggling with both anxiety and depression. Feels extremely fatigued. Impacting overall productivity and daily life. She is sleeping well at night time. Excessively tired during the day. Compliant with Tirosint daily.     Subjective      Review of Systems:   Review of Systems - See HPI    I have reviewed and the following portions of the patient's history were updated as appropriate: past family history, past medical history, past social history, past surgical history and problem list.    Medications:     Current Outpatient Medications:     clotrimazole-betamethasone (LOTRISONE) 1-0.05 % cream, Apply 1 Application topically to the appropriate area as directed 2 (Two) Times a Day. Use for 7-14 days. Stop if no improvement by day 7., Disp: 45 g, Rfl: 0    hydrOXYzine (ATARAX) 50 MG tablet, Take 1 tablet by mouth 3 (Three) Times a Day As Needed (panic)., Disp: 30 tablet, Rfl: 0    ondansetron ODT (ZOFRAN-ODT) 4 MG disintegrating tablet, Place 1 tablet on the  "tongue Every 8 (Eight) Hours As Needed for Nausea or Vomiting., Disp: 30 tablet, Rfl: 0    propranolol (INDERAL) 10 MG tablet, Take 1 tablet by mouth 3 (Three) Times a Day As Needed (anxiety)., Disp: 90 tablet, Rfl: 3    SUMAtriptan (Imitrex) 50 MG tablet, Take one tablet at onset of headache. May repeat dose one time in 2 hours if headache not relieved., Disp: 12 tablet, Rfl: 0    Tirosint 75 MCG capsule, Take 1 capsule by mouth Daily., Disp: 90 capsule, Rfl: 3    liothyronine (CYTOMEL) 5 MCG tablet, Take 0.5 tablets by mouth Daily., Disp: 30 tablet, Rfl: 2    sertraline (Zoloft) 25 MG tablet, Take 1 tablet by mouth Daily., Disp: 30 tablet, Rfl: 2    Allergies:   Allergies   Allergen Reactions    Corn-Containing Products GI Intolerance    Dairy Aid [Tilactase] GI Intolerance    Dye Fdc Red [Red Dye #40 (Allura Red)] GI Intolerance    Gluten Meal GI Intolerance    Soybean-Containing Drug Products GI Intolerance       Objective     Physical Exam:  Vital Signs:   Vitals:    11/27/24 0801   BP: 112/62   Pulse: 74   SpO2: 95%   Weight: 62.5 kg (137 lb 12.8 oz)   Height: 172.7 cm (67.99\")     Body mass index is 20.96 kg/m².     Physical Exam  Vitals reviewed.   Constitutional:       Appearance: Normal appearance.   Pulmonary:      Effort: Pulmonary effort is normal. No respiratory distress.   Neurological:      Mental Status: She is alert.   Psychiatric:         Mood and Affect: Mood normal.         Behavior: Behavior normal.         Judgment: Judgment normal.         Assessment / Plan      Assessment/Plan:   Problems Addressed This Visit  Diagnoses and all orders for this visit:    1. Moderate episode of recurrent major depressive disorder (Primary)  -     sertraline (Zoloft) 25 MG tablet; Take 1 tablet by mouth Daily.  Dispense: 30 tablet; Refill: 2    2. Generalized anxiety disorder with panic attacks  -     sertraline (Zoloft) 25 MG tablet; Take 1 tablet by mouth Daily.  Dispense: 30 tablet; Refill: 2    3. " Hypothyroidism due to Hashimoto's thyroiditis  -     liothyronine (CYTOMEL) 5 MCG tablet; Take 0.5 tablets by mouth Daily.  Dispense: 30 tablet; Refill: 2  -     TSH; Future  -     T4, free; Future  -     T3; Future    4. Low serum triiodothyronine (T3)  -     liothyronine (CYTOMEL) 5 MCG tablet; Take 0.5 tablets by mouth Daily.  Dispense: 30 tablet; Refill: 2  -     TSH; Future  -     T4, free; Future  -     T3; Future      Anxiety/depression - uncontrolled. Lexapro ineffective, wellbutrin caused worsening of symptoms. Will switch to zoloft 25mg daily, reassess in 6 weeks.     Hypothyroidism - normal TSH and T4. T3 a little low. She is feeling fatigued, bloating. Will try adding low dose cytomel 2.5mcg daily to help boost T3. Repeat TFTs in 6 weeks.     Plan of care reviewed with patient at the conclusion of today's visit. Education was provided regarding diagnosis and management.  Patient verbalizes understanding of and agreement with management plan.    Follow Up:   Return in about 6 weeks (around 1/8/2025) for Recheck mood.        DO RENEE Washington [address]  [facility]  [address]  [place], [facility] [address]  Fax [phone]  Phone [phone]

## 2025-01-20 ENCOUNTER — OFFICE VISIT (OUTPATIENT)
Dept: FAMILY MEDICINE CLINIC | Facility: CLINIC | Age: 35
End: 2025-01-20
Payer: COMMERCIAL

## 2025-01-20 ENCOUNTER — LAB (OUTPATIENT)
Dept: LAB | Facility: HOSPITAL | Age: 35
End: 2025-01-20
Payer: COMMERCIAL

## 2025-01-20 VITALS
HEART RATE: 52 BPM | WEIGHT: 136.4 LBS | BODY MASS INDEX: 20.67 KG/M2 | OXYGEN SATURATION: 99 % | DIASTOLIC BLOOD PRESSURE: 58 MMHG | SYSTOLIC BLOOD PRESSURE: 108 MMHG | HEIGHT: 68 IN

## 2025-01-20 DIAGNOSIS — R92.8 ABNORMAL MRI, BREAST: ICD-10-CM

## 2025-01-20 DIAGNOSIS — E06.3 HYPOTHYROIDISM DUE TO HASHIMOTO'S THYROIDITIS: ICD-10-CM

## 2025-01-20 DIAGNOSIS — F41.1 GENERALIZED ANXIETY DISORDER WITH PANIC ATTACKS: ICD-10-CM

## 2025-01-20 DIAGNOSIS — R79.89 LOW SERUM TRIIODOTHYRONINE (T3): ICD-10-CM

## 2025-01-20 DIAGNOSIS — N63.10 MASS OF RIGHT BREAST, UNSPECIFIED QUADRANT: ICD-10-CM

## 2025-01-20 DIAGNOSIS — F33.1 MODERATE EPISODE OF RECURRENT MAJOR DEPRESSIVE DISORDER: Primary | ICD-10-CM

## 2025-01-20 DIAGNOSIS — R92.30 DENSE BREAST: ICD-10-CM

## 2025-01-20 DIAGNOSIS — F41.0 GENERALIZED ANXIETY DISORDER WITH PANIC ATTACKS: ICD-10-CM

## 2025-01-20 LAB
T3 SERPL-MCNC: 67.1 NG/DL (ref 80–200)
T4 FREE SERPL-MCNC: 1.11 NG/DL (ref 0.92–1.68)
TSH SERPL DL<=0.05 MIU/L-ACNC: 2.17 UIU/ML (ref 0.27–4.2)

## 2025-01-20 PROCEDURE — 99214 OFFICE O/P EST MOD 30 MIN: CPT | Performed by: STUDENT IN AN ORGANIZED HEALTH CARE EDUCATION/TRAINING PROGRAM

## 2025-01-20 PROCEDURE — 84443 ASSAY THYROID STIM HORMONE: CPT

## 2025-01-20 PROCEDURE — 84480 ASSAY TRIIODOTHYRONINE (T3): CPT

## 2025-01-20 PROCEDURE — 84439 ASSAY OF FREE THYROXINE: CPT

## 2025-01-20 NOTE — PROGRESS NOTES
"  Established Office Visit      Patient Name: Damari Muller  : 1990   MRN: [id number]   Care Team: Patient Care Team:  Ivelisse Burnett DO as PCP - General (Internal Medicine)  Efrain Siu PA-C as Physician Assistant (Endocrinology)  Tasia Betancourt APRN as Nurse Practitioner (Psychiatry)    Chief Complaint:    Chief Complaint   Patient presents with    Depression       History of Present Illness: Damari Muller is a 34 y.o. female  with hypothyroidism, celiac disease, anxiety/depression who is here today to follow up with concerns as detailed below.    She was last seen about 8 weeks ago. At that time, lexapro was switch to zoloft due to ineffectiveness. Since switching she reports slight improvement in overall mood. Feeling less irritable. Having less depressed days although they still come and go. Reports a decrease in frequency of \"dark thoughts\". Denies medication side effects.     We also added low dose cytomel due to low T3. Due for TFTs today. Still feels very fatigued and has not noticed much of a difference with this.     She reports hard lump in right breast which has been present for >6 months. First noted after her biopsy in the same area. Lump has somewhat improved in size over the past few weeks. Previously very tender and no longer as tender. She is due for 6 month breast imaging follow up     Subjective      Review of Systems:   Review of Systems - See HPI    I have reviewed and the following portions of the patient's history were updated as appropriate: past family history, past medical history, past social history, past surgical history and problem list.    Medications:     Current Outpatient Medications:     clotrimazole-betamethasone (LOTRISONE) 1-0.05 % cream, Apply 1 Application topically to the appropriate area as directed 2 (Two) Times a Day. Use for 7-14 days. Stop if no improvement by day 7., Disp: 45 g, Rfl: 0    hydrOXYzine (ATARAX) 50 MG tablet, " "Take 1 tablet by mouth 3 (Three) Times a Day As Needed (panic)., Disp: 30 tablet, Rfl: 0    liothyronine (CYTOMEL) 5 MCG tablet, Take 0.5 tablets by mouth Daily., Disp: 30 tablet, Rfl: 2    ondansetron ODT (ZOFRAN-ODT) 4 MG disintegrating tablet, Place 1 tablet on the tongue Every 8 (Eight) Hours As Needed for Nausea or Vomiting., Disp: 30 tablet, Rfl: 0    propranolol (INDERAL) 10 MG tablet, Take 1 tablet by mouth 3 (Three) Times a Day As Needed (anxiety)., Disp: 90 tablet, Rfl: 3    sertraline (Zoloft) 50 MG tablet, Take 1 tablet by mouth Daily., Disp: 30 tablet, Rfl: 2    SUMAtriptan (Imitrex) 50 MG tablet, Take one tablet at onset of headache. May repeat dose one time in 2 hours if headache not relieved., Disp: 12 tablet, Rfl: 0    Tirosint 75 MCG capsule, Take 1 capsule by mouth Daily., Disp: 90 capsule, Rfl: 3    Allergies:   Allergies   Allergen Reactions    Corn-Containing Products GI Intolerance    Dairy Aid [Tilactase] GI Intolerance    Dye Fdc Red [Red Dye #40 (Allura Red)] GI Intolerance    Gluten Meal GI Intolerance    Soybean-Containing Drug Products GI Intolerance       Objective     Physical Exam:  Vital Signs:   Vitals:    01/20/25 0840   BP: 108/58   Pulse: 52   SpO2: 99%   Weight: 61.9 kg (136 lb 6.4 oz)   Height: 172.7 cm (67.99\")     Body mass index is 20.75 kg/m².     Physical Exam  Vitals reviewed.   Constitutional:       Appearance: Normal appearance.   Cardiovascular:      Rate and Rhythm: Normal rate.   Pulmonary:      Effort: Pulmonary effort is normal. No respiratory distress.   Neurological:      Mental Status: She is alert.   Psychiatric:         Mood and Affect: Mood normal.         Behavior: Behavior normal.         Judgment: Judgment normal.         Assessment / Plan      Assessment/Plan:   Problems Addressed This Visit  Diagnoses and all orders for this visit:    1. Moderate episode of recurrent major depressive disorder (Primary)  -     sertraline (Zoloft) 50 MG tablet; Take 1 " tablet by mouth Daily.  Dispense: 30 tablet; Refill: 2    2. Generalized anxiety disorder with panic attacks  -     sertraline (Zoloft) 50 MG tablet; Take 1 tablet by mouth Daily.  Dispense: 30 tablet; Refill: 2    3. Hypothyroidism due to Hashimoto's thyroiditis    4. Low serum triiodothyronine (T3)    5. Dense breast  -     MRI Breast Bilateral Diagnostic W WO Contrast; Future    6. Abnormal MRI, breast  -     MRI Breast Bilateral Diagnostic W WO Contrast; Future    7. Mass of right breast, unspecified quadrant  -     MRI Breast Bilateral Diagnostic W WO Contrast; Future      Depression - uncontrolled but improving with transition from lexapro to Zoloft. Increase Zoloft from 25mg to 50mg daily     Hypothyroidism - due for TFTs - currently taking cytomel 2.5mcg and Tirosint 75mcg daily       Plan of care reviewed with patient at the conclusion of today's visit. Education was provided regarding diagnosis and management.  Patient verbalizes understanding of and agreement with management plan.    Follow Up: 3 months as currently scheduled for annual and mental health follow up  No follow-ups on file.        DO RENEE Washington RD  [facility]  [address]  [facility] [address]  Fax [phone]  Phone [phone]

## 2025-01-21 ENCOUNTER — PATIENT MESSAGE (OUTPATIENT)
Dept: FAMILY MEDICINE CLINIC | Facility: CLINIC | Age: 35
End: 2025-01-21
Payer: COMMERCIAL

## 2025-01-21 DIAGNOSIS — E06.3 HYPOTHYROIDISM DUE TO HASHIMOTO'S THYROIDITIS: ICD-10-CM

## 2025-01-21 DIAGNOSIS — R79.89 LOW SERUM TRIIODOTHYRONINE (T3): ICD-10-CM

## 2025-01-21 RX ORDER — LIOTHYRONINE SODIUM 5 UG/1
2.5 TABLET ORAL 2 TIMES DAILY
Qty: 60 TABLET | Refills: 2 | Status: SHIPPED | OUTPATIENT
Start: 2025-01-21

## 2025-02-04 ENCOUNTER — TELEMEDICINE (OUTPATIENT)
Dept: FAMILY MEDICINE CLINIC | Facility: CLINIC | Age: 35
End: 2025-02-04
Payer: COMMERCIAL

## 2025-02-04 VITALS — BODY MASS INDEX: 20.75 KG/M2 | HEIGHT: 68 IN

## 2025-02-04 DIAGNOSIS — F41.9 ANXIETY: ICD-10-CM

## 2025-02-04 DIAGNOSIS — Z13.220 SCREENING FOR CHOLESTEROL LEVEL: ICD-10-CM

## 2025-02-04 DIAGNOSIS — F43.23 ADJUSTMENT DISORDER WITH MIXED ANXIETY AND DEPRESSED MOOD: ICD-10-CM

## 2025-02-04 DIAGNOSIS — R00.2 PALPITATIONS: ICD-10-CM

## 2025-02-04 DIAGNOSIS — K90.0 CELIAC DISEASE: ICD-10-CM

## 2025-02-04 DIAGNOSIS — F41.0 GENERALIZED ANXIETY DISORDER WITH PANIC ATTACKS: Primary | ICD-10-CM

## 2025-02-04 DIAGNOSIS — E06.3 HYPOTHYROIDISM DUE TO HASHIMOTO'S THYROIDITIS: ICD-10-CM

## 2025-02-04 DIAGNOSIS — F41.0 PANIC ATTACK: ICD-10-CM

## 2025-02-04 DIAGNOSIS — R79.89 LOW SERUM TRIIODOTHYRONINE (T3): ICD-10-CM

## 2025-02-04 DIAGNOSIS — F33.1 MODERATE EPISODE OF RECURRENT MAJOR DEPRESSIVE DISORDER: ICD-10-CM

## 2025-02-04 DIAGNOSIS — F41.1 GENERALIZED ANXIETY DISORDER WITH PANIC ATTACKS: Primary | ICD-10-CM

## 2025-02-04 PROCEDURE — 99214 OFFICE O/P EST MOD 30 MIN: CPT | Performed by: STUDENT IN AN ORGANIZED HEALTH CARE EDUCATION/TRAINING PROGRAM

## 2025-02-04 RX ORDER — SERTRALINE HYDROCHLORIDE 100 MG/1
100 TABLET, FILM COATED ORAL DAILY
Qty: 30 TABLET | Refills: 2 | Status: SHIPPED | OUTPATIENT
Start: 2025-02-04

## 2025-02-04 NOTE — PROGRESS NOTES
Virtual Video Visit      Date: 2025   Patient Name: Damari Muller  : 1990   MRN: [id number]     Chief Complaint:    Chief Complaint   Patient presents with    Panic Attack     Discuss paperwork        I have reviewed the E-Visit questionnaire and the patient's answers, my assessment and plan are listed below.     This provider is located at the home address on file with [facility]. using a secure [employer] Video Visit through [employer]. Patient is being seen remotely via telehealth at their home address in [place], and stated they are in a secure environment for this session. The patient's condition being diagnosed/treated is appropriate for telemedicine. The provider identified herself as well as her credentials. The patient, and/or patients guardian, consent to be seen remotely, and when consent is given they understand that the consent allows for patient identifiable information to be sent to a third party as needed. They may refuse to be seen remotely at any time. The electronic data is encrypted and password protected, and the patient and/or guardian has been advised of the potential risks to privacy not withstanding such measures.    You have chosen to receive care through a virtual video visit. Do you consent to use a video/audio connection for your medical care today? Yes    History of Present Illness: Damari Muller is a 34 y.o. female who is seen today to follow up with mental health concerns.     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 There are several changes occurring within her employment - mostly surrounding her position as a remote employee.  As of  her employer is requesting all remote workers transition back into the office. She has worked remotely for years. In the workplace, she is required to use shared spaces with others causing distraction. She finds this extremely anxiety provoking.  She is able to complete tasks/meet  "productivity requirements equally if not more efficiently at home than in office setting. She has been able to demonstrate this as a remote employee for >1 year now.     She feels her mental health is not in a good place right now. She is crying daily. Feels anxiety is uncontrolled. Admits to fleeting \"dark thoughts\" without intention or plan for self harm. Extremely difficult for her to focus unless she is in her safe, quite environment.      Subjective      Review of Systems:   Review of Systems    I have reviewed and the following portions of the patient's history were updated as appropriate: past family history, past medical history, past social history, past surgical history and problem list.    Medications:     Current Outpatient Medications:     clotrimazole-betamethasone (LOTRISONE) 1-0.05 % cream, Apply 1 Application topically to the appropriate area as directed 2 (Two) Times a Day. Use for 7-14 days. Stop if no improvement by day 7., Disp: 45 g, Rfl: 0    hydrOXYzine (ATARAX) 50 MG tablet, Take 1 tablet by mouth 3 (Three) Times a Day As Needed (panic)., Disp: 30 tablet, Rfl: 0    liothyronine (CYTOMEL) 5 MCG tablet, Take 0.5 tablets by mouth 2 (Two) Times a Day., Disp: 60 tablet, Rfl: 2    ondansetron ODT (ZOFRAN-ODT) 4 MG disintegrating tablet, Place 1 tablet on the tongue Every 8 (Eight) Hours As Needed for Nausea or Vomiting., Disp: 30 tablet, Rfl: 0    propranolol (INDERAL) 10 MG tablet, Take 1 tablet by mouth 3 (Three) Times a Day As Needed (anxiety)., Disp: 90 tablet, Rfl: 3    sertraline (Zoloft) 100 MG tablet, Take 1 tablet by mouth Daily., Disp: 30 tablet, Rfl: 2    SUMAtriptan (Imitrex) 50 MG tablet, Take one tablet at onset of headache. May repeat dose one time in 2 hours if headache not relieved., Disp: 12 tablet, Rfl: 0    Tirosint 75 MCG capsule, Take 1 capsule by mouth Daily., Disp: 90 capsule, Rfl: 3    Allergies:   Allergies   Allergen Reactions    Corn-Containing Products GI Intolerance " "   Dairy Aid [Tilactase] GI Intolerance    Dye Fdc Red [Red Dye #40 (Allura Red)] GI Intolerance    Gluten Meal GI Intolerance    Soybean-Containing Drug Products GI Intolerance       Objective     Physical Exam:  Vital Signs:   Vitals:    02/04/25 0901   Height: 172.7 cm (67.99\")     Body mass index is 20.75 kg/m².    Physical Exam  Vitals reviewed.   Constitutional:       Appearance: Normal appearance.   Pulmonary:      Effort: Pulmonary effort is normal. No respiratory distress.   Neurological:      Mental Status: She is alert.   Psychiatric:         Behavior: Behavior normal.         Judgment: Judgment normal.      Comments: Anxious, tearful           Assessment / Plan      Assessment/Plan:   Diagnoses and all orders for this visit:    1. Generalized anxiety disorder with panic attacks (Primary)  -     sertraline (Zoloft) 100 MG tablet; Take 1 tablet by mouth Daily.  Dispense: 30 tablet; Refill: 2    2. Hypothyroidism due to Hashimoto's thyroiditis    3. Low serum triiodothyronine (T3)    4. Screening for cholesterol level  -     Lipid Panel w/ Chol/HDL Ratio; Future    5. Celiac disease    6. Adjustment disorder with mixed anxiety and depressed mood    7. Panic attack    8. Anxiety  -     Comprehensive metabolic panel; Future  -     CBC No Differential; Future    9. Palpitations  -     Comprehensive metabolic panel; Future  -     CBC No Differential; Future    10. Moderate episode of recurrent major depressive disorder  -     Comprehensive metabolic panel; Future  -     CBC No Differential; Future  -     sertraline (Zoloft) 100 MG tablet; Take 1 tablet by mouth Daily.  Dispense: 30 tablet; Refill: 2           Damari Muller is my patient for all primary care needs. She has been receiving treatment for adjustment disorder with mixed symptoms and anxiety with panic. Today we discussed request for reasonable accommodations in the workplace in setting of her uncontrolled anxiety/depression and emotional " instability. It is recommended that she continue to work remotely to ensure a personal, nonshared workspace and reduce unnecessary travel. Office workspace serves as a trigger for her anxiety with panic. She is unable to effectively perform her work duties due to limitations surrounding her mental health in the office. She is, however, able to successfully meet work requirements/tasks/responsibilities asked of her while working remotely.   Working remotely and limiting travel would alleviate anxiety and allow her to perform at her optimal capacity, improving her work productivity and overall mental health.      We also discussed current treatment plan - will increase Zoloft from 50mg to 100mg. Not interested in talk therapy at this time. Would be interested in referral to behavioral health for medication management If we are not able to achieve effective results with Zoloft.   Continue hydroxyzine, propranolol prn anxiety/panic    Follow Up: 2-3 months as currently scheduled   No follow-ups on file.    Any medications prescribed have been sent electronically to patient's preferred pharmacy     Ivelisse Burnett DO  [facility]  02/04/25  09:07 EST

## 2025-02-18 ENCOUNTER — PATIENT MESSAGE (OUTPATIENT)
Dept: FAMILY MEDICINE CLINIC | Facility: CLINIC | Age: 35
End: 2025-02-18
Payer: COMMERCIAL

## 2025-02-27 ENCOUNTER — OFFICE VISIT (OUTPATIENT)
Dept: FAMILY MEDICINE CLINIC | Facility: CLINIC | Age: 35
End: 2025-02-27
Payer: COMMERCIAL

## 2025-02-27 VITALS
WEIGHT: 133.2 LBS | HEART RATE: 78 BPM | HEIGHT: 68 IN | BODY MASS INDEX: 20.19 KG/M2 | SYSTOLIC BLOOD PRESSURE: 110 MMHG | DIASTOLIC BLOOD PRESSURE: 72 MMHG | OXYGEN SATURATION: 98 %

## 2025-02-27 DIAGNOSIS — F33.1 MODERATE EPISODE OF RECURRENT MAJOR DEPRESSIVE DISORDER: Primary | ICD-10-CM

## 2025-02-27 DIAGNOSIS — F41.1 GENERALIZED ANXIETY DISORDER WITH PANIC ATTACKS: ICD-10-CM

## 2025-02-27 DIAGNOSIS — F41.0 GENERALIZED ANXIETY DISORDER WITH PANIC ATTACKS: ICD-10-CM

## 2025-02-27 DIAGNOSIS — F41.0 PANIC ATTACK: ICD-10-CM

## 2025-02-27 PROCEDURE — 99214 OFFICE O/P EST MOD 30 MIN: CPT | Performed by: STUDENT IN AN ORGANIZED HEALTH CARE EDUCATION/TRAINING PROGRAM

## 2025-02-27 NOTE — PROGRESS NOTES
Established Office Visit      Patient Name: Damari Muller  : 1990   MRN: [id number]   Care Team: Patient Care Team:  Ivelisse Burnett DO as PCP - General (Internal Medicine)  Efrain Siu PA-C as Physician Assistant (Endocrinology)  Tasia Betancourt APRN as Nurse Practitioner (Psychiatry)    Chief Complaint:    Chief Complaint   Patient presents with    Depression       History of Present Illness: Damari Muller is a 34 y.o. adult who is here today to follow up with mental health.    Patient was last seen about 3-4 weeks ago for uncontrolled anxiety/depression exacerbated by work environment. Zoloft was increased from 50mg to 100mg. She also has hydroxyzine, propranolol prn anxiety/panic. Has been using propranolol more often lately and this is helpful. Feels anxiety is suffocating at times.   She does feel the increase in zoloft has started to help but there is an extreme about of uncertainty surrounding her current position and its stability. This has caused a debilitating sense of anxiety for her. Her department is undergoing budget cuts, there are talks of government shut down and concern for employees being laid off. She is wanting to use her sick leave to take 2 weeks off. She has accrued several months of sick leave. Needs doctor note to use said sick leave for 2 consecutive weeks. She has had history of SI but denies current SI.     Subjective      Review of Systems:   Review of Systems - See HPI    I have reviewed and the following portions of the patient's history were updated as appropriate: past family history, past medical history, past social history, past surgical history and problem list.    Medications:     Current Outpatient Medications:     clotrimazole-betamethasone (LOTRISONE) 1-0.05 % cream, Apply 1 Application topically to the appropriate area as directed 2 (Two) Times a Day. Use for 7-14 days. Stop if no improvement by day 7., Disp: 45 g, Rfl: 0     "hydrOXYzine (ATARAX) 50 MG tablet, Take 1 tablet by mouth 3 (Three) Times a Day As Needed (panic)., Disp: 30 tablet, Rfl: 0    liothyronine (CYTOMEL) 5 MCG tablet, Take 0.5 tablets by mouth 2 (Two) Times a Day., Disp: 60 tablet, Rfl: 2    ondansetron ODT (ZOFRAN-ODT) 4 MG disintegrating tablet, Place 1 tablet on the tongue Every 8 (Eight) Hours As Needed for Nausea or Vomiting., Disp: 30 tablet, Rfl: 0    propranolol (INDERAL) 10 MG tablet, Take 1 tablet by mouth 3 (Three) Times a Day As Needed (anxiety)., Disp: 90 tablet, Rfl: 3    sertraline (Zoloft) 100 MG tablet, Take 1 tablet by mouth Daily., Disp: 30 tablet, Rfl: 2    SUMAtriptan (Imitrex) 50 MG tablet, Take one tablet at onset of headache. May repeat dose one time in 2 hours if headache not relieved., Disp: 12 tablet, Rfl: 0    Tirosint 75 MCG capsule, Take 1 capsule by mouth Daily., Disp: 90 capsule, Rfl: 3    Allergies:   Allergies   Allergen Reactions    Corn-Containing Products GI Intolerance    Dairy Aid [Tilactase] GI Intolerance    Dye Fdc Red [Red Dye #40 (Allura Red)] GI Intolerance    Gluten Meal GI Intolerance    Soybean-Containing Drug Products GI Intolerance       Objective     Physical Exam:  Vital Signs:   Vitals:    02/27/25 1231   BP: 110/72   Pulse: 78   SpO2: 98%   Weight: 60.4 kg (133 lb 3.2 oz)   Height: 172.7 cm (67.99\")     Body mass index is 20.26 kg/m².     Physical Exam  Vitals reviewed.   Constitutional:       Appearance: Normal appearance.   Cardiovascular:      Rate and Rhythm: Normal rate.   Pulmonary:      Effort: Pulmonary effort is normal. No respiratory distress.   Skin:     General: Skin is warm and dry.   Neurological:      Mental Status: She is alert.   Psychiatric:         Behavior: Behavior normal.         Judgment: Judgment normal.      Comments: tearful         Assessment / Plan      Assessment/Plan:   Problems Addressed This Visit  Diagnoses and all orders for this visit:    1. Moderate episode of recurrent major " depressive disorder (Primary)    2. Generalized anxiety disorder with panic attacks    3. Panic attack      Uncontrolled anxiety with panic, depression.  Patient has been struggling with severe anxiety/depression surrounding workplace uncertainty/changes. I fear her mental health is in a fragile position and I agree she is at risk for self harm if she does not take time to step away from this to focus on mental health. No immediate risk of self harm present today. Work note provided - she is planning to use her accrued sick leave for this 2 week leave and s already had this approved by her employer. She has strong support system and aware of community resources if she were to develop thoughts of self harm/intent. Close follow up as currently scheduled, encouraged her to reach out any time sooner if needed. Continue zoloft, has responded well to this and hopeful she will continue to experience more benefit as she is on current dose for extended period of time. Encouraged outdoor exposure, meditation, exercise, and time allotted to consideration of next steps/alternative employment if her current position is no longer available.   Not interested in talk therapy at this time. Would be interested in referral to behavioral health for medication management If we are not able to achieve effective results with Zoloft.   Continue hydroxyzine, propranolol prn anxiety/panic    Plan of care reviewed with patient at the conclusion of today's visit. Education was provided regarding diagnosis and management.  Patient verbalizes understanding of and agreement with management plan.    Follow Up: as currently scheduled in 6-8 weeks, sooner if needed  No follow-ups on file.        DO RENEE Washington RD  [facility]  [address]  [facility] [id number]  Fax [phone]  Phone [phone]

## 2025-02-27 NOTE — LETTER
February 27, 2025     Patient: Damari Muller   YOB: 1990   Date of Visit: 2/27/2025       To Whom It May Concern:    It is my medical opinion that Damari Muller should remain out of work between 3/3/25-3/14/25 due to medical illness and ongoing treatment.     Sincerely,        Ivelisse Burnett DO    CC: No Recipients

## 2025-03-03 ENCOUNTER — HOSPITAL ENCOUNTER (OUTPATIENT)
Facility: HOSPITAL | Age: 35
Discharge: HOME | End: 2025-03-03
Admitting: STUDENT IN AN ORGANIZED HEALTH CARE EDUCATION/TRAINING PROGRAM
Payer: COMMERCIAL

## 2025-03-03 DIAGNOSIS — N63.10 MASS OF RIGHT BREAST, UNSPECIFIED QUADRANT: ICD-10-CM

## 2025-03-03 DIAGNOSIS — R92.30 DENSE BREAST: ICD-10-CM

## 2025-03-03 DIAGNOSIS — R92.8 ABNORMAL MRI, BREAST: ICD-10-CM

## 2025-03-03 PROCEDURE — 25510000002 GADOBENATE DIMEGLUMINE 529 MG/ML SOLUTION: Performed by: STUDENT IN AN ORGANIZED HEALTH CARE EDUCATION/TRAINING PROGRAM

## 2025-03-03 PROCEDURE — C8908 MRI W/O FOL W/CONT, BREAST,: HCPCS

## 2025-03-03 PROCEDURE — A9577 INJ MULTIHANCE: HCPCS | Performed by: STUDENT IN AN ORGANIZED HEALTH CARE EDUCATION/TRAINING PROGRAM

## 2025-03-03 PROCEDURE — C8937 CAD BREAST MRI: HCPCS

## 2025-03-03 RX ADMIN — GADOBENATE DIMEGLUMINE 12 ML: 529 INJECTION, SOLUTION INTRAVENOUS at 08:44

## 2025-03-12 ENCOUNTER — TELEPHONE (OUTPATIENT)
Dept: MRI IMAGING | Facility: HOSPITAL | Age: 35
End: 2025-03-12
Payer: COMMERCIAL

## 2025-03-12 NOTE — TELEPHONE ENCOUNTER
Patient was recommended from her [facility] for a bilateral 2nd look ultrasounds. Scheduled for 3/26/2025 at 9:00 with 8:45 arrival at [address]. No BT. Encouraged to call with any further questions. Patient states she can't tolerate epinephrine due to anxiety attacks.

## 2025-03-26 ENCOUNTER — HOSPITAL ENCOUNTER (OUTPATIENT)
Dept: ULTRASOUND IMAGING | Facility: HOSPITAL | Age: 35
Discharge: HOME | End: 2025-03-26
Payer: COMMERCIAL

## 2025-03-26 ENCOUNTER — TRANSCRIBE ORDERS (OUTPATIENT)
Dept: FAMILY MEDICINE CLINIC | Facility: CLINIC | Age: 35
End: 2025-03-26
Payer: COMMERCIAL

## 2025-03-26 DIAGNOSIS — N63.10 MASS OF RIGHT BREAST, UNSPECIFIED QUADRANT: ICD-10-CM

## 2025-03-26 DIAGNOSIS — R92.8 ABNORMAL MRI, BREAST: ICD-10-CM

## 2025-03-26 DIAGNOSIS — R92.30 DENSE BREAST: ICD-10-CM

## 2025-03-26 DIAGNOSIS — R92.8 ABNORMAL MAMMOGRAM: Primary | ICD-10-CM

## 2025-03-26 PROCEDURE — 76642 ULTRASOUND BREAST LIMITED: CPT | Performed by: RADIOLOGY

## 2025-03-26 PROCEDURE — 76642 ULTRASOUND BREAST LIMITED: CPT

## 2025-04-25 ENCOUNTER — LAB (OUTPATIENT)
Dept: LAB | Facility: HOSPITAL | Age: 35
End: 2025-04-25
Payer: COMMERCIAL

## 2025-04-25 DIAGNOSIS — F33.1 MODERATE EPISODE OF RECURRENT MAJOR DEPRESSIVE DISORDER: ICD-10-CM

## 2025-04-25 DIAGNOSIS — F41.9 ANXIETY: ICD-10-CM

## 2025-04-25 DIAGNOSIS — R79.89 LOW SERUM TRIIODOTHYRONINE (T3): ICD-10-CM

## 2025-04-25 DIAGNOSIS — R00.2 PALPITATIONS: ICD-10-CM

## 2025-04-25 DIAGNOSIS — Z13.220 SCREENING FOR CHOLESTEROL LEVEL: ICD-10-CM

## 2025-04-25 DIAGNOSIS — E06.3 HYPOTHYROIDISM DUE TO HASHIMOTO'S THYROIDITIS: ICD-10-CM

## 2025-04-25 LAB
ALBUMIN SERPL-MCNC: 4.4 G/DL (ref 3.5–5.2)
ALBUMIN/GLOB SERPL: 1.6 G/DL
ALP SERPL-CCNC: 57 U/L (ref 39–117)
ALT SERPL W P-5'-P-CCNC: 13 U/L (ref 1–33)
ANION GAP SERPL CALCULATED.3IONS-SCNC: 12.4 MMOL/L (ref 5–15)
AST SERPL-CCNC: 26 U/L (ref 1–32)
BILIRUB SERPL-MCNC: 0.3 MG/DL (ref 0–1.2)
BUN SERPL-MCNC: 14 MG/DL (ref 6–20)
BUN/CREAT SERPL: 16.3 (ref 7–25)
CALCIUM SPEC-SCNC: 9.1 MG/DL (ref 8.6–10.5)
CHLORIDE SERPL-SCNC: 105 MMOL/L (ref 98–107)
CHOLEST SERPL-MCNC: 142 MG/DL (ref 0–200)
CO2 SERPL-SCNC: 22.6 MMOL/L (ref 22–29)
CREAT SERPL-MCNC: 0.86 MG/DL (ref 0.57–1)
DEPRECATED RDW RBC AUTO: 41.6 FL (ref 37–54)
EGFRCR SERPLBLD CKD-EPI 2021: 89.4 ML/MIN/1.73
ERYTHROCYTE [DISTWIDTH] IN BLOOD BY AUTOMATED COUNT: 12 % (ref 12.3–15.4)
GLOBULIN UR ELPH-MCNC: 2.7 GM/DL
GLUCOSE SERPL-MCNC: 121 MG/DL (ref 65–99)
HCT VFR BLD AUTO: 37.7 % (ref 34–46.6)
HDLC SERPL QL: 1.67
HDLC SERPL-MCNC: 85 MG/DL (ref 40–60)
HGB BLD-MCNC: 12.3 G/DL (ref 12–15.9)
LDLC SERPL CALC-MCNC: 44 MG/DL (ref 0–100)
MCH RBC QN AUTO: 31.1 PG (ref 26.6–33)
MCHC RBC AUTO-ENTMCNC: 32.6 G/DL (ref 31.5–35.7)
MCV RBC AUTO: 95.2 FL (ref 79–97)
PLATELET # BLD AUTO: 171 10*3/MM3 (ref 140–450)
PMV BLD AUTO: 12.1 FL (ref 6–12)
POTASSIUM SERPL-SCNC: 3.8 MMOL/L (ref 3.5–5.2)
PROT SERPL-MCNC: 7.1 G/DL (ref 6–8.5)
RBC # BLD AUTO: 3.96 10*6/MM3 (ref 3.77–5.28)
SODIUM SERPL-SCNC: 140 MMOL/L (ref 136–145)
T3 SERPL-MCNC: 80.2 NG/DL (ref 80–200)
T4 FREE SERPL-MCNC: 1.21 NG/DL (ref 0.92–1.68)
TRIGL SERPL-MCNC: 64 MG/DL (ref 0–150)
TSH SERPL DL<=0.05 MIU/L-ACNC: 1.4 UIU/ML (ref 0.27–4.2)
VLDLC SERPL-MCNC: 13 MG/DL (ref 5–40)
WBC NRBC COR # BLD AUTO: 4.52 10*3/MM3 (ref 3.4–10.8)

## 2025-04-25 PROCEDURE — 80050 GENERAL HEALTH PANEL: CPT

## 2025-04-25 PROCEDURE — 80061 LIPID PANEL: CPT

## 2025-04-25 PROCEDURE — 84480 ASSAY TRIIODOTHYRONINE (T3): CPT

## 2025-04-25 PROCEDURE — 84439 ASSAY OF FREE THYROXINE: CPT

## 2025-04-28 ENCOUNTER — OFFICE VISIT (OUTPATIENT)
Dept: FAMILY MEDICINE CLINIC | Facility: CLINIC | Age: 35
End: 2025-04-28
Payer: COMMERCIAL

## 2025-04-28 VITALS
OXYGEN SATURATION: 98 % | DIASTOLIC BLOOD PRESSURE: 66 MMHG | HEIGHT: 68 IN | SYSTOLIC BLOOD PRESSURE: 110 MMHG | WEIGHT: 136 LBS | HEART RATE: 79 BPM | BODY MASS INDEX: 20.61 KG/M2

## 2025-04-28 DIAGNOSIS — F41.0 GENERALIZED ANXIETY DISORDER WITH PANIC ATTACKS: ICD-10-CM

## 2025-04-28 DIAGNOSIS — F33.1 MODERATE EPISODE OF RECURRENT MAJOR DEPRESSIVE DISORDER: ICD-10-CM

## 2025-04-28 DIAGNOSIS — F41.9 ANXIETY: ICD-10-CM

## 2025-04-28 DIAGNOSIS — F41.1 GENERALIZED ANXIETY DISORDER WITH PANIC ATTACKS: ICD-10-CM

## 2025-04-28 DIAGNOSIS — Z23 IMMUNIZATION DUE: ICD-10-CM

## 2025-04-28 DIAGNOSIS — E06.3 HYPOTHYROIDISM DUE TO HASHIMOTO'S THYROIDITIS: ICD-10-CM

## 2025-04-28 DIAGNOSIS — Z00.00 ANNUAL PHYSICAL EXAM: Primary | ICD-10-CM

## 2025-04-28 RX ORDER — LEVOTHYROXINE SODIUM 75 UG/1
75 CAPSULE ORAL DAILY
Qty: 90 CAPSULE | Refills: 3 | Status: SHIPPED | OUTPATIENT
Start: 2025-04-28

## 2025-04-28 RX ORDER — HYDROXYZINE HYDROCHLORIDE 50 MG/1
50 TABLET, FILM COATED ORAL 3 TIMES DAILY PRN
Qty: 30 TABLET | Refills: 0 | Status: SHIPPED | OUTPATIENT
Start: 2025-04-28

## 2025-04-28 RX ORDER — PROPRANOLOL HYDROCHLORIDE 10 MG/1
10 TABLET ORAL 3 TIMES DAILY PRN
Qty: 90 TABLET | Refills: 3 | Status: SHIPPED | OUTPATIENT
Start: 2025-04-28

## 2025-04-28 RX ORDER — SERTRALINE HYDROCHLORIDE 100 MG/1
100 TABLET, FILM COATED ORAL DAILY
Qty: 30 TABLET | Refills: 2 | Status: SHIPPED | OUTPATIENT
Start: 2025-04-28

## 2025-04-28 NOTE — LETTER
[facility]  Vaccine Consent Form    Patient Name:  Damari Muller  Patient :  1990     Vaccine(s) Ordered    Tdap Vaccine => 8yo IM (BOOSTRIX/ADACEL)        Screening Checklist  The following questions should be completed prior to vaccination. If you answer “yes” to any question, it does not necessarily mean you should not be vaccinated. It just means we may need to clarify or ask more questions. If a question is unclear, please ask your healthcare provider to explain it.    Yes No   Any fever or moderate to severe illness today (mild illness and/or antibiotic treatment are not contraindications)?     Do you have a history of a serious reaction to any previous vaccinations, such as anaphylaxis, encephalopathy within 7 days, Guillain-Tiffin syndrome within 6 weeks, seizure?     Have you received any live vaccine(s) (e.g MMR, ROBBY) or any other vaccines in the last month (to ensure duplicate doses aren't given)?     Do you have an anaphylactic allergy to latex (DTaP, DTaP-IPV, Hep A, Hep B, MenB, RV, Td, Tdap), baker’s yeast (Hep B, HPV), polysorbates (RSV, nirsevimab, PCV 20, Rotavirrus, Tdap, Shingrix), or gelatin (ROBBY, MMR)?     Do you have an anaphylactic allergy to neomycin (Rabies, ROBBY, MMR, IPV, Hep A), polymyxin B (IPV), or streptomycin (IPV)?      Any cancer, leukemia, AIDS, or other immune system disorder? (ROBBY, MMR, RV)     Do you have a parent, brother, or sister with an immune system problem (if immune competence of vaccine recipient clinically verified, can proceed)? (MMR, ROBBY)     Any recent steroid treatments for >2 weeks, chemotherapy, or radiation treatment? (ROBBY, MMR)     Have you received antibody-containing blood transfusions or IVIG in the past 11 months (recommended interval is dependent on product)? (MMR, ROBBY)     Have you taken antiviral drugs (acyclovir, famciclovir, valacyclovir for ROBBY) in the last 24 or 48 hours, respectively?      Are you pregnant or planning to become  "pregnant within 1 month? (ROBBY, MMR, HPV, IPV, MenB, Abrexvy; For Hep B- refer to Engerix-B; For RSV - Abrysvo is indicated for 32-36 weeks of pregnancy from September to January)     For infants, have you ever been told your child has had intussusception or a medical emergency involving obstruction of the intestine (Rotavirus)? If not for an infant, can skip this question.         *Ordering Physicians/APC should be consulted if \"yes\" is checked by the patient or guardian above.  I have received, read, and understand the Vaccine Information Statement (VIS) for each vaccine ordered.  I have considered my or my child's health status as well as the health status of my close contacts.  I have taken the opportunity to discuss my vaccine questions with my or my child's health care provider.   I have requested that the ordered vaccine(s) be given to me or my child.  I understand the benefits and risks of the vaccines.  I understand that I should remain in the clinic for 15 minutes after receiving the vaccine(s).  _________________________________________________________  Signature of Patient or Parent/Legal Guardian ____________________  Date     "

## 2025-04-28 NOTE — PROGRESS NOTES
Physical Exam     Patient Name: Damari Muller  : 1990   MRN: [id number]   Care Team: Patient Care Team:  Ivelisse Burnett DO as PCP - General (Internal Medicine)  Efrain Siu PA-C as Physician Assistant (Endocrinology)  Tasia Betancourt APRN as Nurse Practitioner (Psychiatry)    Chief Complaint:    Chief Complaint   Patient presents with    Annual Exam       History of Present Illness: Damari Muller is a 35 y.o. adult with anxiety, depression hypothyroidism who is presents today for annual healthcare maintenance.     She unfortunately lost her father after a long myrick with alzheimers 2 weeks ago. Has been coping as best she can. Currently looking into grief support groups/counseling.     She resigned from her job about 2 weeks ago as well ([employer] position going through several changes, offered severance for the next 6 months and she has chosen to take this). Difficult decision but ultimately this job was taking a toll on her mental health and feels this was for the best.      Depression: PHQ-2 Depression Screening  PHQ-2 Depression Screening  Little interest or pleasure in doing things? Not at all   Feeling down, depressed, or hopeless? Not at all   PHQ-2 Total Score 0          Health Maintenance   Topic Date Due    TDAP/TD VACCINES (1 - Tdap) 10/01/2025 (Originally 2007)    INFLUENZA VACCINE  2025    PAP SMEAR  2026    ANNUAL PHYSICAL  2026    HEPATITIS C SCREENING  Completed    Pneumococcal Vaccine 0-49  Aged Out    COVID-19 Vaccine  Discontinued       Subjective      Review of Systems:   Review of Systems - See HPI    Past Medical History:   Past Medical History:   Diagnosis Date    Allergic 2010    Celiac    Anemia     Ankle sprain 2024    Xray did not reveal break/fracture    Anxiety     Celiac disease     Depression     GERD (gastroesophageal reflux disease) 2016    Hashimoto's thyroiditis 2010    History of medical problems      Diagnosed with adjustment disorder    Hypothyroidism     Migraine     Scoliosis 2004    Testosterone deficiency 2021    Recently had additional blood panels done and found low T&P    Thyroid disease        Past Surgical History:   Past Surgical History:   Procedure Laterality Date    COLONOSCOPY  2016    LEEP      WISDOM TOOTH EXTRACTION         Family History:   Family History   Problem Relation Age of Onset    Migraines Mother     Breast cancer Mother 30    Cancer Mother     COPD Father     ADD / ADHD Sister     Cancer Maternal Grandfather     Cancer Maternal Grandmother     Migraines Maternal Grandmother     Thyroid disease Maternal Grandmother     Ovarian cancer Maternal Grandmother 80       Social History:   Social History     Socioeconomic History    Marital status: Single   Tobacco Use    Smoking status: Never     Passive exposure: Past    Smokeless tobacco: Never   Vaping Use    Vaping status: Never Used    Passive vaping exposure: Yes   Substance and Sexual Activity    Alcohol use: Yes     Comment: Occasional social drinking    Drug use: Never    Sexual activity: Not Currently     Birth control/protection: Other, None       Tobacco History:   Social History     Tobacco Use   Smoking Status Never    Passive exposure: Past   Smokeless Tobacco Never       Medications:     Current Outpatient Medications:     clotrimazole-betamethasone (LOTRISONE) 1-0.05 % cream, Apply 1 Application topically to the appropriate area as directed 2 (Two) Times a Day. Use for 7-14 days. Stop if no improvement by day 7., Disp: 45 g, Rfl: 0    hydrOXYzine (ATARAX) 50 MG tablet, Take 1 tablet by mouth 3 (Three) Times a Day As Needed (panic)., Disp: 30 tablet, Rfl: 0    liothyronine (CYTOMEL) 5 MCG tablet, Take 0.5 tablets by mouth 2 (Two) Times a Day., Disp: 60 tablet, Rfl: 2    ondansetron ODT (ZOFRAN-ODT) 4 MG disintegrating tablet, Place 1 tablet on the tongue Every 8 (Eight) Hours As Needed for Nausea or Vomiting., Disp: 30  "tablet, Rfl: 0    propranolol (INDERAL) 10 MG tablet, Take 1 tablet by mouth 3 (Three) Times a Day As Needed (anxiety)., Disp: 90 tablet, Rfl: 3    sertraline (Zoloft) 100 MG tablet, Take 1 tablet by mouth Daily., Disp: 30 tablet, Rfl: 2    SUMAtriptan (Imitrex) 50 MG tablet, Take one tablet at onset of headache. May repeat dose one time in 2 hours if headache not relieved., Disp: 12 tablet, Rfl: 0    Tirosint 75 MCG capsule, Take 1 capsule by mouth Daily., Disp: 90 capsule, Rfl: 3    Allergies:   Allergies   Allergen Reactions    Corn-Containing Products GI Intolerance    Dairy Aid [Tilactase] GI Intolerance    Dye Fdc Red [Red Dye #40 (Allura Red)] GI Intolerance    Gluten Meal GI Intolerance    Soybean-Containing Drug Products GI Intolerance       Past Medical History, Social History, Family History and Care Team were all reviewed with patient and updated as appropriate.       Objective     Physical Exam:  Vital Signs:   Vitals:    04/28/25 0859   BP: 110/66   Pulse: 79   SpO2: 98%   Weight: 61.7 kg (136 lb)   Height: 172.7 cm (67.99\")     Body mass index is 20.68 kg/m².     Physical Exam  Vitals reviewed.   Constitutional:       Appearance: Normal appearance. She is not ill-appearing.   Cardiovascular:      Rate and Rhythm: Normal rate and regular rhythm.      Heart sounds: Normal heart sounds. No murmur heard.  Pulmonary:      Effort: Pulmonary effort is normal. No respiratory distress.      Breath sounds: Normal breath sounds. No wheezing.   Abdominal:      General: Abdomen is flat. There is no distension.      Palpations: Abdomen is soft.      Tenderness: There is no abdominal tenderness.   Skin:     General: Skin is warm and dry.   Neurological:      Mental Status: She is alert.   Psychiatric:         Mood and Affect: Mood normal.         Behavior: Behavior normal.         Judgment: Judgment normal.         Assessment / Plan      Assessment/Plan:   Problems Addressed This Visit  Diagnoses and all orders for " this visit:    1. Annual physical exam (Primary)    2. Moderate episode of recurrent major depressive disorder  -     sertraline (Zoloft) 100 MG tablet; Take 1 tablet by mouth Daily.  Dispense: 30 tablet; Refill: 2  -     propranolol (INDERAL) 10 MG tablet; Take 1 tablet by mouth 3 (Three) Times a Day As Needed (anxiety).  Dispense: 90 tablet; Refill: 3    3. Generalized anxiety disorder with panic attacks  -     sertraline (Zoloft) 100 MG tablet; Take 1 tablet by mouth Daily.  Dispense: 30 tablet; Refill: 2  -     propranolol (INDERAL) 10 MG tablet; Take 1 tablet by mouth 3 (Three) Times a Day As Needed (anxiety).  Dispense: 90 tablet; Refill: 3    4. Hypothyroidism due to Hashimoto's thyroiditis  -     Tirosint 75 MCG capsule; Take 1 capsule by mouth Daily.  Dispense: 90 capsule; Refill: 3    5. Anxiety  -     hydrOXYzine (ATARAX) 50 MG tablet; Take 1 tablet by mouth 3 (Three) Times a Day As Needed (panic).  Dispense: 30 tablet; Refill: 0    6. Immunization due  -     Tdap Vaccine => 6yo IM (BOOSTRIX/ADACEL)        Healthcare Maintenance   Vaccines:  Tdap today   Covid booster encouraged at pharmacy     Cancer Screening  -Mammogram 3/2025 - BIRADS3 - planning for follow up US and MRI imaging in 9/2025   -Colon cancer screening due at age 43  -Pap smear 4/2023 - ASCUS with +HPV - following with Dr. De Guzman, now s/p MANGO and following annually.     Other  -PHQ score 0  -Counseled on importance of maintaining healthy diet and routine exercise. Encouraged 150 min exercise per week.  -Tobacco cessation: not indicated, nonsmoker  -Sexual Health: not currently sexually active, not interested in STI screening   -Blood pressure is at goal <130/80  -Metabolic screening UTD (pt was not fasting during most recent labs)     Encouraged routine eye and dental exams.     She continues to struggle with chronic fatigue, inattentiveness, difficulty completing tasks. Unsure if this is related to her chronic anxiety versus ADHD. She  would like a formal evaluation. She is planning to reach out to behavioral health specliast on her own for this.     Anxiety/depression - continue zoloft 100mg daily and hydroxyzine 50mg TID prn  Hypothyroidism - continue tirosint and cytomel, TFTs wnl      Plan of care reviewed with patient at the conclusion of today's visit. Education was provided regarding diagnosis and management.  Patient verbalizes understanding of and agreement with management plan.    Follow Up:   Return in about 6 months (around 10/28/2025) for Recheck depression/anxiety - sooner as needed.        DO RENEE [place] [facility] [address]  [facility]  [address]  [facility] [address]  Fax [phone]  Phone [phone]

## 2025-05-03 DIAGNOSIS — F33.1 MODERATE EPISODE OF RECURRENT MAJOR DEPRESSIVE DISORDER: ICD-10-CM

## 2025-05-03 DIAGNOSIS — F41.0 GENERALIZED ANXIETY DISORDER WITH PANIC ATTACKS: ICD-10-CM

## 2025-05-03 DIAGNOSIS — F41.1 GENERALIZED ANXIETY DISORDER WITH PANIC ATTACKS: ICD-10-CM

## 2025-05-05 RX ORDER — PROPRANOLOL HYDROCHLORIDE 10 MG/1
10 TABLET ORAL 3 TIMES DAILY PRN
Qty: 90 TABLET | Refills: 3 | OUTPATIENT
Start: 2025-05-05

## 2025-05-05 RX ORDER — SERTRALINE HYDROCHLORIDE 100 MG/1
100 TABLET, FILM COATED ORAL DAILY
Qty: 30 TABLET | Refills: 2 | OUTPATIENT
Start: 2025-05-05

## 2025-05-22 ENCOUNTER — PATIENT MESSAGE (OUTPATIENT)
Dept: FAMILY MEDICINE CLINIC | Facility: CLINIC | Age: 35
End: 2025-05-22
Payer: COMMERCIAL

## 2025-05-22 DIAGNOSIS — M79.671 RIGHT FOOT PAIN: Primary | ICD-10-CM

## 2025-06-17 ENCOUNTER — PATIENT MESSAGE (OUTPATIENT)
Dept: FAMILY MEDICINE CLINIC | Facility: CLINIC | Age: 35
End: 2025-06-17
Payer: COMMERCIAL

## 2025-06-17 DIAGNOSIS — R92.8 ABNORMAL MRI, BREAST: Primary | ICD-10-CM

## 2025-06-17 DIAGNOSIS — N64.4 BREAST PAIN: ICD-10-CM

## 2025-06-17 DIAGNOSIS — N63.10 MASS OF RIGHT BREAST, UNSPECIFIED QUADRANT: ICD-10-CM

## 2025-07-08 DIAGNOSIS — N64.4 BREAST PAIN: Primary | ICD-10-CM

## 2025-07-24 ENCOUNTER — HOSPITAL ENCOUNTER (OUTPATIENT)
Dept: MAMMOGRAPHY | Facility: HOSPITAL | Age: 35
Discharge: HOME | End: 2025-07-24
Payer: COMMERCIAL

## 2025-07-24 ENCOUNTER — RESULTS FOLLOW-UP (OUTPATIENT)
Facility: HOSPITAL | Age: 35
End: 2025-07-24
Payer: COMMERCIAL

## 2025-07-24 ENCOUNTER — HOSPITAL ENCOUNTER (OUTPATIENT)
Dept: ULTRASOUND IMAGING | Facility: HOSPITAL | Age: 35
Discharge: HOME | End: 2025-07-24
Payer: COMMERCIAL

## 2025-07-24 ENCOUNTER — PATIENT MESSAGE (OUTPATIENT)
Dept: FAMILY MEDICINE CLINIC | Facility: CLINIC | Age: 35
End: 2025-07-24
Payer: COMMERCIAL

## 2025-07-24 DIAGNOSIS — N64.4 BREAST PAIN: ICD-10-CM

## 2025-07-24 DIAGNOSIS — Z80.3 FAMILY HISTORY OF BREAST CANCER: ICD-10-CM

## 2025-07-24 DIAGNOSIS — Z80.41 FAMILY HISTORY OF OVARIAN CANCER: Primary | ICD-10-CM

## 2025-07-24 DIAGNOSIS — R92.8 ABNORMAL MRI, BREAST: Primary | ICD-10-CM

## 2025-07-24 LAB
NCCN CRITERIA FLAG: ABNORMAL
TYRER CUZICK SCORE: 14.9

## 2025-07-24 PROCEDURE — 76642 ULTRASOUND BREAST LIMITED: CPT | Performed by: RADIOLOGY

## 2025-07-24 PROCEDURE — 76642 ULTRASOUND BREAST LIMITED: CPT

## 2025-07-24 NOTE — PROGRESS NOTES
I left a voice mail and sent a Aktana message requesting a return call to discuss risk assessment results.

## 2025-08-06 DIAGNOSIS — F33.1 MODERATE EPISODE OF RECURRENT MAJOR DEPRESSIVE DISORDER: ICD-10-CM

## 2025-08-06 DIAGNOSIS — F41.0 GENERALIZED ANXIETY DISORDER WITH PANIC ATTACKS: ICD-10-CM

## 2025-08-06 DIAGNOSIS — F41.1 GENERALIZED ANXIETY DISORDER WITH PANIC ATTACKS: ICD-10-CM

## 2025-08-06 RX ORDER — SERTRALINE HYDROCHLORIDE 100 MG/1
100 TABLET, FILM COATED ORAL DAILY
Qty: 30 TABLET | Refills: 2 | Status: SHIPPED | OUTPATIENT
Start: 2025-08-06

## 2025-08-07 ENCOUNTER — LAB (OUTPATIENT)
Dept: LAB | Facility: HOSPITAL | Age: 35
End: 2025-08-07
Payer: COMMERCIAL

## 2025-08-07 DIAGNOSIS — Z80.3 FAMILY HISTORY OF BREAST CANCER: ICD-10-CM

## 2025-08-07 DIAGNOSIS — Z80.41 FAMILY HISTORY OF OVARIAN CANCER: ICD-10-CM

## 2025-08-07 PROCEDURE — 36415 COLL VENOUS BLD VENIPUNCTURE: CPT

## 2025-08-08 ENCOUNTER — OFFICE VISIT (OUTPATIENT)
Dept: FAMILY MEDICINE CLINIC | Facility: CLINIC | Age: 35
End: 2025-08-08
Payer: COMMERCIAL

## 2025-08-08 VITALS
BODY MASS INDEX: 19.46 KG/M2 | DIASTOLIC BLOOD PRESSURE: 68 MMHG | HEART RATE: 60 BPM | SYSTOLIC BLOOD PRESSURE: 106 MMHG | OXYGEN SATURATION: 99 % | WEIGHT: 128.4 LBS | HEIGHT: 68 IN

## 2025-08-08 DIAGNOSIS — G43.009 MIGRAINE WITHOUT AURA AND WITHOUT STATUS MIGRAINOSUS, NOT INTRACTABLE: Primary | ICD-10-CM

## 2025-08-08 DIAGNOSIS — F41.1 GENERALIZED ANXIETY DISORDER WITH PANIC ATTACKS: ICD-10-CM

## 2025-08-08 DIAGNOSIS — E06.3 HYPOTHYROIDISM DUE TO HASHIMOTO'S THYROIDITIS: ICD-10-CM

## 2025-08-08 DIAGNOSIS — F33.1 MODERATE EPISODE OF RECURRENT MAJOR DEPRESSIVE DISORDER: ICD-10-CM

## 2025-08-08 DIAGNOSIS — F41.0 GENERALIZED ANXIETY DISORDER WITH PANIC ATTACKS: ICD-10-CM

## 2025-08-08 PROCEDURE — 99214 OFFICE O/P EST MOD 30 MIN: CPT | Performed by: STUDENT IN AN ORGANIZED HEALTH CARE EDUCATION/TRAINING PROGRAM

## 2025-08-08 RX ORDER — PROPRANOLOL HYDROCHLORIDE 10 MG/1
10 TABLET ORAL 2 TIMES DAILY
Qty: 90 TABLET | Refills: 3 | Status: SHIPPED | OUTPATIENT
Start: 2025-08-08

## 2025-08-15 ENCOUNTER — PATIENT MESSAGE (OUTPATIENT)
Dept: FAMILY MEDICINE CLINIC | Facility: CLINIC | Age: 35
End: 2025-08-15
Payer: COMMERCIAL

## 2025-08-15 ENCOUNTER — LAB (OUTPATIENT)
Dept: LAB | Facility: HOSPITAL | Age: 35
End: 2025-08-15
Payer: COMMERCIAL

## 2025-08-15 ENCOUNTER — PRIOR AUTHORIZATION (OUTPATIENT)
Dept: FAMILY MEDICINE CLINIC | Facility: CLINIC | Age: 35
End: 2025-08-15
Payer: COMMERCIAL

## 2025-08-15 DIAGNOSIS — E06.3 HYPOTHYROIDISM DUE TO HASHIMOTO'S THYROIDITIS: ICD-10-CM

## 2025-08-15 DIAGNOSIS — G43.009 MIGRAINE WITHOUT AURA AND WITHOUT STATUS MIGRAINOSUS, NOT INTRACTABLE: Primary | ICD-10-CM

## 2025-08-15 LAB
T3 SERPL-MCNC: 65.4 NG/DL (ref 80–200)
TSH SERPL DL<=0.05 MIU/L-ACNC: 2.56 UIU/ML (ref 0.27–4.2)

## 2025-08-15 PROCEDURE — 84443 ASSAY THYROID STIM HORMONE: CPT

## 2025-08-15 PROCEDURE — 84480 ASSAY TRIIODOTHYRONINE (T3): CPT

## 2025-08-16 LAB
AMBRY INTERPRETATION REPORT: NORMAL
GENE DIS ANL INTERP-IMP: NORMAL

## 2025-08-29 ENCOUNTER — HOSPITAL ENCOUNTER (OUTPATIENT)
Dept: MRI IMAGING | Facility: HOSPITAL | Age: 35
Discharge: HOME | End: 2025-08-29
Admitting: STUDENT IN AN ORGANIZED HEALTH CARE EDUCATION/TRAINING PROGRAM
Payer: COMMERCIAL

## 2025-08-29 DIAGNOSIS — R92.8 ABNORMAL MRI, BREAST: ICD-10-CM

## 2025-08-29 PROCEDURE — A9577 INJ MULTIHANCE: HCPCS | Performed by: STUDENT IN AN ORGANIZED HEALTH CARE EDUCATION/TRAINING PROGRAM

## 2025-08-29 PROCEDURE — 25510000002 GADOBENATE DIMEGLUMINE 529 MG/ML SOLUTION: Performed by: STUDENT IN AN ORGANIZED HEALTH CARE EDUCATION/TRAINING PROGRAM

## 2025-08-29 PROCEDURE — 77049 MRI BREAST C-+ W/CAD BI: CPT

## 2025-08-29 RX ADMIN — GADOBENATE DIMEGLUMINE 11 ML: 529 INJECTION, SOLUTION INTRAVENOUS at 12:25
